# Patient Record
Sex: MALE | Race: WHITE | NOT HISPANIC OR LATINO | Employment: UNEMPLOYED | URBAN - METROPOLITAN AREA
[De-identification: names, ages, dates, MRNs, and addresses within clinical notes are randomized per-mention and may not be internally consistent; named-entity substitution may affect disease eponyms.]

---

## 2023-09-03 ENCOUNTER — HOSPITAL ENCOUNTER (INPATIENT)
Facility: HOSPITAL | Age: 48
LOS: 5 days | Discharge: HOME | DRG: 286 | End: 2023-09-08
Attending: FAMILY MEDICINE | Admitting: INTERNAL MEDICINE
Payer: MEDICAID

## 2023-09-03 DIAGNOSIS — I50.20 HFREF (HEART FAILURE WITH REDUCED EJECTION FRACTION): ICD-10-CM

## 2023-09-03 DIAGNOSIS — J90 PLEURAL EFFUSION: ICD-10-CM

## 2023-09-03 DIAGNOSIS — I34.0 NONRHEUMATIC MITRAL VALVE REGURGITATION: Chronic | ICD-10-CM

## 2023-09-03 DIAGNOSIS — I50.21 ACUTE SYSTOLIC CONGESTIVE HEART FAILURE: ICD-10-CM

## 2023-09-03 DIAGNOSIS — I50.9 CHF (CONGESTIVE HEART FAILURE): ICD-10-CM

## 2023-09-03 DIAGNOSIS — R06.02 SHORTNESS OF BREATH: ICD-10-CM

## 2023-09-03 DIAGNOSIS — R06.00 DYSPNEA: ICD-10-CM

## 2023-09-03 DIAGNOSIS — J18.9 PNEUMONIA OF BOTH LOWER LOBES DUE TO INFECTIOUS ORGANISM: Primary | ICD-10-CM

## 2023-09-03 DIAGNOSIS — I50.23 ACUTE ON CHRONIC HFREF (HEART FAILURE WITH REDUCED EJECTION FRACTION): ICD-10-CM

## 2023-09-03 LAB
ALBUMIN SERPL-MCNC: 3.3 G/DL (ref 3.5–5)
ALBUMIN/GLOB SERPL: 1 RATIO (ref 1.1–2)
ALP SERPL-CCNC: 93 UNIT/L (ref 40–150)
ALT SERPL-CCNC: 19 UNIT/L (ref 0–55)
APTT PPP: 31 SECONDS (ref 23.2–33.7)
AST SERPL-CCNC: 21 UNIT/L (ref 5–34)
BASOPHILS # BLD AUTO: 0.06 X10(3)/MCL
BASOPHILS NFR BLD AUTO: 0.6 %
BILIRUB SERPL-MCNC: 0.3 MG/DL
BNP BLD-MCNC: 835.5 PG/ML
BUN SERPL-MCNC: 15.7 MG/DL (ref 8.9–20.6)
CALCIUM SERPL-MCNC: 8.8 MG/DL (ref 8.4–10.2)
CHLORIDE SERPL-SCNC: 106 MMOL/L (ref 98–107)
CK MB SERPL-MCNC: 3.5 NG/ML
CK SERPL-CCNC: 129 U/L (ref 30–200)
CO2 SERPL-SCNC: 25 MMOL/L (ref 22–29)
CREAT SERPL-MCNC: 0.68 MG/DL (ref 0.73–1.18)
D DIMER PPP IA.FEU-MCNC: 0.8 UG/ML FEU (ref 0–0.5)
EOSINOPHIL # BLD AUTO: 0.13 X10(3)/MCL (ref 0–0.9)
EOSINOPHIL NFR BLD AUTO: 1.3 %
ERYTHROCYTE [DISTWIDTH] IN BLOOD BY AUTOMATED COUNT: 12.6 % (ref 11.5–17)
FLUAV AG UPPER RESP QL IA.RAPID: NOT DETECTED
FLUBV AG UPPER RESP QL IA.RAPID: NOT DETECTED
GFR SERPLBLD CREATININE-BSD FMLA CKD-EPI: >60 MLS/MIN/1.73/M2
GLOBULIN SER-MCNC: 3.4 GM/DL (ref 2.4–3.5)
GLUCOSE SERPL-MCNC: 103 MG/DL (ref 74–100)
HCT VFR BLD AUTO: 42.2 % (ref 42–52)
HGB BLD-MCNC: 13.8 G/DL (ref 14–18)
IMM GRANULOCYTES # BLD AUTO: 0.03 X10(3)/MCL (ref 0–0.04)
IMM GRANULOCYTES NFR BLD AUTO: 0.3 %
INR PPP: 1
LYMPHOCYTES # BLD AUTO: 2.14 X10(3)/MCL (ref 0.6–4.6)
LYMPHOCYTES NFR BLD AUTO: 21.1 %
MAGNESIUM SERPL-MCNC: 1.8 MG/DL (ref 1.6–2.6)
MCH RBC QN AUTO: 29.9 PG (ref 27–31)
MCHC RBC AUTO-ENTMCNC: 32.7 G/DL (ref 33–36)
MCV RBC AUTO: 91.3 FL (ref 80–94)
MONOCYTES # BLD AUTO: 0.83 X10(3)/MCL (ref 0.1–1.3)
MONOCYTES NFR BLD AUTO: 8.2 %
NEUTROPHILS # BLD AUTO: 6.96 X10(3)/MCL (ref 2.1–9.2)
NEUTROPHILS NFR BLD AUTO: 68.5 %
NRBC BLD AUTO-RTO: 0 %
PLATELET # BLD AUTO: 350 X10(3)/MCL (ref 130–400)
PMV BLD AUTO: 9.9 FL (ref 7.4–10.4)
POTASSIUM SERPL-SCNC: 3.8 MMOL/L (ref 3.5–5.1)
PROT SERPL-MCNC: 6.7 GM/DL (ref 6.4–8.3)
PROTHROMBIN TIME: 13.2 SECONDS (ref 11.4–14)
RBC # BLD AUTO: 4.62 X10(6)/MCL (ref 4.7–6.1)
SARS-COV-2 RNA RESP QL NAA+PROBE: NOT DETECTED
SODIUM SERPL-SCNC: 139 MMOL/L (ref 136–145)
TROPONIN I SERPL-MCNC: <0.01 NG/ML (ref 0–0.04)
TSH SERPL-ACNC: 2.13 UIU/ML (ref 0.35–4.94)
WBC # SPEC AUTO: 10.15 X10(3)/MCL (ref 4.5–11.5)

## 2023-09-03 PROCEDURE — 94660 CPAP INITIATION&MGMT: CPT

## 2023-09-03 PROCEDURE — 84443 ASSAY THYROID STIM HORMONE: CPT | Performed by: FAMILY MEDICINE

## 2023-09-03 PROCEDURE — 83735 ASSAY OF MAGNESIUM: CPT | Performed by: FAMILY MEDICINE

## 2023-09-03 PROCEDURE — 84484 ASSAY OF TROPONIN QUANT: CPT | Performed by: FAMILY MEDICINE

## 2023-09-03 PROCEDURE — 94761 N-INVAS EAR/PLS OXIMETRY MLT: CPT

## 2023-09-03 PROCEDURE — 27000221 HC OXYGEN, UP TO 24 HOURS

## 2023-09-03 PROCEDURE — 85610 PROTHROMBIN TIME: CPT | Performed by: FAMILY MEDICINE

## 2023-09-03 PROCEDURE — 82550 ASSAY OF CK (CPK): CPT | Performed by: FAMILY MEDICINE

## 2023-09-03 PROCEDURE — 0240U COVID/FLU A&B PCR: CPT | Performed by: FAMILY MEDICINE

## 2023-09-03 PROCEDURE — 21400001 HC TELEMETRY ROOM

## 2023-09-03 PROCEDURE — 85379 FIBRIN DEGRADATION QUANT: CPT | Performed by: FAMILY MEDICINE

## 2023-09-03 PROCEDURE — 83880 ASSAY OF NATRIURETIC PEPTIDE: CPT | Performed by: FAMILY MEDICINE

## 2023-09-03 PROCEDURE — 85025 COMPLETE CBC W/AUTO DIFF WBC: CPT | Performed by: FAMILY MEDICINE

## 2023-09-03 PROCEDURE — 99900035 HC TECH TIME PER 15 MIN (STAT)

## 2023-09-03 PROCEDURE — 27000190 HC CPAP FULL FACE MASK W/VALVE

## 2023-09-03 PROCEDURE — 99285 EMERGENCY DEPT VISIT HI MDM: CPT | Mod: 25

## 2023-09-03 PROCEDURE — 82553 CREATINE MB FRACTION: CPT | Performed by: FAMILY MEDICINE

## 2023-09-03 PROCEDURE — 93005 ELECTROCARDIOGRAM TRACING: CPT

## 2023-09-03 PROCEDURE — 85730 THROMBOPLASTIN TIME PARTIAL: CPT | Performed by: FAMILY MEDICINE

## 2023-09-03 PROCEDURE — 96375 TX/PRO/DX INJ NEW DRUG ADDON: CPT

## 2023-09-03 PROCEDURE — 96365 THER/PROPH/DIAG IV INF INIT: CPT

## 2023-09-03 PROCEDURE — 80053 COMPREHEN METABOLIC PANEL: CPT | Performed by: FAMILY MEDICINE

## 2023-09-03 RX ORDER — NITROGLYCERIN 20 MG/100ML
0-400 INJECTION INTRAVENOUS CONTINUOUS
Status: DISCONTINUED | OUTPATIENT
Start: 2023-09-04 | End: 2023-09-04

## 2023-09-03 RX ORDER — ACETAMINOPHEN 500 MG
1000 TABLET ORAL
Status: COMPLETED | OUTPATIENT
Start: 2023-09-04 | End: 2023-09-04

## 2023-09-03 NOTE — Clinical Note
Patient has been sick the whole time on the midodrine and was stop last Thursday and has improved with the shakes but  patient still has low BP and weakness. Son states he will not be giver her the medicine anymore.  Please advise something different per son The sheath was inserted into the right radial artery antegrade.

## 2023-09-03 NOTE — Clinical Note
The radial band was applied to the right radial artery. 10 cc's of air were inserted into the closure device. Fingers warm to touch. Cap refill < 3 sec. Move fingers w/ out any pain or discomfort.

## 2023-09-03 NOTE — Clinical Note
Please see anesthesia documentation. Anesthesia administering IV sedation medication for the procedure.

## 2023-09-03 NOTE — Clinical Note
42 ml of contrast were injected throughout the case. 10 mL of contrast was the total wasted during the case. 52 mL was the total amount used during the case.

## 2023-09-04 LAB
A-ADO2 BLOOD GAS (OHS): 124 MMHG
ALBUMIN SERPL-MCNC: 3.1 G/DL (ref 3.5–5)
ALBUMIN/GLOB SERPL: 0.9 RATIO (ref 1.1–2)
ALLENS TEST BLOOD GAS (OHS): YES
ALP SERPL-CCNC: 88 UNIT/L (ref 40–150)
ALT SERPL-CCNC: 18 UNIT/L (ref 0–55)
APPEARANCE UR: CLEAR
AST SERPL-CCNC: 18 UNIT/L (ref 5–34)
BACTERIA #/AREA URNS AUTO: ABNORMAL /HPF
BASE EXCESS BLD CALC-SCNC: 4.7 MMOL/L (ref -2–2)
BASOPHILS # BLD AUTO: 0.05 X10(3)/MCL
BASOPHILS NFR BLD AUTO: 0.6 %
BILIRUB SERPL-MCNC: 0.4 MG/DL
BILIRUB UR QL STRIP.AUTO: NEGATIVE
BLOOD GAS SAMPLE TYPE (OHS): ABNORMAL
BUN SERPL-MCNC: 12 MG/DL (ref 8.9–20.6)
CALCIUM SERPL-MCNC: 8.7 MG/DL (ref 8.4–10.2)
CHLORIDE SERPL-SCNC: 104 MMOL/L (ref 98–107)
CHOLEST SERPL-MCNC: 170 MG/DL
CHOLEST/HDLC SERPL: 3 {RATIO} (ref 0–5)
CO2 BLDA-SCNC: 29.3 MMOL/L (ref 22–26)
CO2 SERPL-SCNC: 25 MMOL/L (ref 22–29)
COHGB MFR BLDA: 2.4 % (ref 0.5–1.5)
COLOR UR: COLORLESS
CREAT SERPL-MCNC: 0.76 MG/DL (ref 0.73–1.18)
DRAWN BY BLOOD GAS (OHS): ABNORMAL
EOSINOPHIL # BLD AUTO: 0.07 X10(3)/MCL (ref 0–0.9)
EOSINOPHIL NFR BLD AUTO: 0.8 %
ERYTHROCYTE [DISTWIDTH] IN BLOOD BY AUTOMATED COUNT: 12.7 % (ref 11.5–17)
EST. AVERAGE GLUCOSE BLD GHB EST-MCNC: 108.3 MG/DL
FIO2 BLOOD GAS (OHS): 32 %
GFR SERPLBLD CREATININE-BSD FMLA CKD-EPI: >60 MLS/MIN/1.73/M2
GLOBULIN SER-MCNC: 3.3 GM/DL (ref 2.4–3.5)
GLUCOSE SERPL-MCNC: 142 MG/DL (ref 74–100)
GLUCOSE UR QL STRIP.AUTO: NORMAL
HBA1C MFR BLD: 5.4 %
HCO3 BLDA-SCNC: 28.2 MMOL/L (ref 22–26)
HCT VFR BLD AUTO: 41.5 % (ref 42–52)
HDLC SERPL-MCNC: 66 MG/DL (ref 35–60)
HGB BLD-MCNC: 13.8 G/DL (ref 14–18)
HYALINE CASTS #/AREA URNS LPF: ABNORMAL /LPF
IMM GRANULOCYTES # BLD AUTO: 0.02 X10(3)/MCL (ref 0–0.04)
IMM GRANULOCYTES NFR BLD AUTO: 0.2 %
KETONES UR QL STRIP.AUTO: NEGATIVE
LDLC SERPL CALC-MCNC: 88 MG/DL (ref 50–140)
LEUKOCYTE ESTERASE UR QL STRIP.AUTO: NEGATIVE
LPM (OHS): 3
LYMPHOCYTES # BLD AUTO: 1.79 X10(3)/MCL (ref 0.6–4.6)
LYMPHOCYTES NFR BLD AUTO: 20.9 %
MAGNESIUM SERPL-MCNC: 1.9 MG/DL (ref 1.6–2.6)
MCH RBC QN AUTO: 30.3 PG (ref 27–31)
MCHC RBC AUTO-ENTMCNC: 33.3 G/DL (ref 33–36)
MCV RBC AUTO: 91 FL (ref 80–94)
METHGB MFR BLDA: 1 % (ref 0–1.5)
MONOCYTES # BLD AUTO: 0.71 X10(3)/MCL (ref 0.1–1.3)
MONOCYTES NFR BLD AUTO: 8.3 %
MUCOUS THREADS URNS QL MICRO: ABNORMAL /LPF
NEUTROPHILS # BLD AUTO: 5.94 X10(3)/MCL (ref 2.1–9.2)
NEUTROPHILS NFR BLD AUTO: 69.2 %
NITRITE UR QL STRIP.AUTO: NEGATIVE
NRBC BLD AUTO-RTO: 0 %
O2 HB BLOOD GAS (OHS): 90.2 % (ref 94–100)
OXYGEN DEVICE BLOOD GAS (OHS): ABNORMAL
OXYHGB MFR BLDA: 13.8 G/DL (ref 12–18)
PAO2 BLOOD GAS (OHS): 182 MMHG
PCO2 BLDA: 37 MMHG (ref 35–45)
PH BLDA: 7.49 [PH] (ref 7.35–7.45)
PH UR STRIP.AUTO: 5.5 [PH]
PHOSPHATE SERPL-MCNC: 2.9 MG/DL (ref 2.3–4.7)
PLATELET # BLD AUTO: 321 X10(3)/MCL (ref 130–400)
PMV BLD AUTO: 10.2 FL (ref 7.4–10.4)
PO2 BLDA: 58 MMHG (ref 75–100)
POTASSIUM SERPL-SCNC: 3.5 MMOL/L (ref 3.5–5.1)
PROT SERPL-MCNC: 6.4 GM/DL (ref 6.4–8.3)
PROT UR QL STRIP.AUTO: NEGATIVE
RBC # BLD AUTO: 4.56 X10(6)/MCL (ref 4.7–6.1)
RBC #/AREA URNS AUTO: ABNORMAL /HPF
RBC UR QL AUTO: NEGATIVE
SAMPLE SITE BLOOD GAS (OHS): ABNORMAL
SAO2 % BLDA: 93.3 %
SODIUM SERPL-SCNC: 139 MMOL/L (ref 136–145)
SP GR UR STRIP.AUTO: 1.04 (ref 1–1.03)
SQUAMOUS #/AREA URNS LPF: ABNORMAL /HPF
TRIGL SERPL-MCNC: 80 MG/DL (ref 34–140)
TROPONIN I SERPL-MCNC: <0.01 NG/ML (ref 0–0.04)
UROBILINOGEN UR STRIP-ACNC: NORMAL
VLDLC SERPL CALC-MCNC: 16 MG/DL
WBC # SPEC AUTO: 8.58 X10(3)/MCL (ref 4.5–11.5)
WBC #/AREA URNS AUTO: ABNORMAL /HPF

## 2023-09-04 PROCEDURE — 80053 COMPREHEN METABOLIC PANEL: CPT | Performed by: STUDENT IN AN ORGANIZED HEALTH CARE EDUCATION/TRAINING PROGRAM

## 2023-09-04 PROCEDURE — 83036 HEMOGLOBIN GLYCOSYLATED A1C: CPT | Performed by: STUDENT IN AN ORGANIZED HEALTH CARE EDUCATION/TRAINING PROGRAM

## 2023-09-04 PROCEDURE — 63700000 PHARM REV CODE 250 ALT 637 W/O HCPCS: Performed by: INTERNAL MEDICINE

## 2023-09-04 PROCEDURE — 85025 COMPLETE CBC W/AUTO DIFF WBC: CPT | Performed by: STUDENT IN AN ORGANIZED HEALTH CARE EDUCATION/TRAINING PROGRAM

## 2023-09-04 PROCEDURE — 94761 N-INVAS EAR/PLS OXIMETRY MLT: CPT

## 2023-09-04 PROCEDURE — 83735 ASSAY OF MAGNESIUM: CPT | Performed by: STUDENT IN AN ORGANIZED HEALTH CARE EDUCATION/TRAINING PROGRAM

## 2023-09-04 PROCEDURE — 94660 CPAP INITIATION&MGMT: CPT

## 2023-09-04 PROCEDURE — 63600175 PHARM REV CODE 636 W HCPCS: Performed by: STUDENT IN AN ORGANIZED HEALTH CARE EDUCATION/TRAINING PROGRAM

## 2023-09-04 PROCEDURE — 63600175 PHARM REV CODE 636 W HCPCS: Performed by: INTERNAL MEDICINE

## 2023-09-04 PROCEDURE — 25000003 PHARM REV CODE 250: Performed by: INTERNAL MEDICINE

## 2023-09-04 PROCEDURE — 27000221 HC OXYGEN, UP TO 24 HOURS

## 2023-09-04 PROCEDURE — 82803 BLOOD GASES ANY COMBINATION: CPT

## 2023-09-04 PROCEDURE — 25500020 PHARM REV CODE 255

## 2023-09-04 PROCEDURE — 99900035 HC TECH TIME PER 15 MIN (STAT)

## 2023-09-04 PROCEDURE — 81001 URINALYSIS AUTO W/SCOPE: CPT | Performed by: FAMILY MEDICINE

## 2023-09-04 PROCEDURE — 84484 ASSAY OF TROPONIN QUANT: CPT | Performed by: INTERNAL MEDICINE

## 2023-09-04 PROCEDURE — 63600175 PHARM REV CODE 636 W HCPCS: Performed by: FAMILY MEDICINE

## 2023-09-04 PROCEDURE — 21400001 HC TELEMETRY ROOM

## 2023-09-04 PROCEDURE — 25000003 PHARM REV CODE 250: Performed by: FAMILY MEDICINE

## 2023-09-04 PROCEDURE — 80061 LIPID PANEL: CPT | Performed by: STUDENT IN AN ORGANIZED HEALTH CARE EDUCATION/TRAINING PROGRAM

## 2023-09-04 PROCEDURE — 25000003 PHARM REV CODE 250

## 2023-09-04 PROCEDURE — 87040 BLOOD CULTURE FOR BACTERIA: CPT | Performed by: FAMILY MEDICINE

## 2023-09-04 PROCEDURE — 25000003 PHARM REV CODE 250: Performed by: STUDENT IN AN ORGANIZED HEALTH CARE EDUCATION/TRAINING PROGRAM

## 2023-09-04 PROCEDURE — 84100 ASSAY OF PHOSPHORUS: CPT | Performed by: STUDENT IN AN ORGANIZED HEALTH CARE EDUCATION/TRAINING PROGRAM

## 2023-09-04 PROCEDURE — 36600 WITHDRAWAL OF ARTERIAL BLOOD: CPT

## 2023-09-04 RX ORDER — HYDRALAZINE HYDROCHLORIDE 20 MG/ML
10 INJECTION INTRAMUSCULAR; INTRAVENOUS EVERY 4 HOURS PRN
Status: DISCONTINUED | OUTPATIENT
Start: 2023-09-04 | End: 2023-09-05

## 2023-09-04 RX ORDER — ACETAMINOPHEN 325 MG/1
650 TABLET ORAL ONCE
Status: COMPLETED | OUTPATIENT
Start: 2023-09-04 | End: 2023-09-04

## 2023-09-04 RX ORDER — ACETAMINOPHEN 325 MG/1
650 TABLET ORAL EVERY 4 HOURS PRN
Status: DISCONTINUED | OUTPATIENT
Start: 2023-09-04 | End: 2023-09-08 | Stop reason: HOSPADM

## 2023-09-04 RX ORDER — KETOROLAC TROMETHAMINE 30 MG/ML
15 INJECTION, SOLUTION INTRAMUSCULAR; INTRAVENOUS ONCE
Status: COMPLETED | OUTPATIENT
Start: 2023-09-04 | End: 2023-09-04

## 2023-09-04 RX ORDER — FUROSEMIDE 10 MG/ML
40 INJECTION INTRAMUSCULAR; INTRAVENOUS
Status: COMPLETED | OUTPATIENT
Start: 2023-09-04 | End: 2023-09-04

## 2023-09-04 RX ORDER — FUROSEMIDE 10 MG/ML
40 INJECTION INTRAMUSCULAR; INTRAVENOUS ONCE
Status: DISCONTINUED | OUTPATIENT
Start: 2023-09-04 | End: 2023-09-04

## 2023-09-04 RX ORDER — FUROSEMIDE 10 MG/ML
40 INJECTION INTRAMUSCULAR; INTRAVENOUS ONCE
Status: COMPLETED | OUTPATIENT
Start: 2023-09-04 | End: 2023-09-04

## 2023-09-04 RX ORDER — AZITHROMYCIN 250 MG/1
500 TABLET, FILM COATED ORAL DAILY
Status: DISCONTINUED | OUTPATIENT
Start: 2023-09-04 | End: 2023-09-06

## 2023-09-04 RX ORDER — ENOXAPARIN SODIUM 100 MG/ML
40 INJECTION SUBCUTANEOUS EVERY 24 HOURS
Status: DISCONTINUED | OUTPATIENT
Start: 2023-09-04 | End: 2023-09-08 | Stop reason: HOSPADM

## 2023-09-04 RX ORDER — VENLAFAXINE HYDROCHLORIDE 75 MG/1
75 CAPSULE, EXTENDED RELEASE ORAL DAILY
Status: DISCONTINUED | OUTPATIENT
Start: 2023-09-04 | End: 2023-09-05

## 2023-09-04 RX ORDER — SODIUM CHLORIDE 0.9 % (FLUSH) 0.9 %
10 SYRINGE (ML) INJECTION
Status: DISCONTINUED | OUTPATIENT
Start: 2023-09-04 | End: 2023-09-08 | Stop reason: HOSPADM

## 2023-09-04 RX ADMIN — AZITHROMYCIN MONOHYDRATE 500 MG: 500 INJECTION, POWDER, LYOPHILIZED, FOR SOLUTION INTRAVENOUS at 02:09

## 2023-09-04 RX ADMIN — PIPERACILLIN AND TAZOBACTAM 4.5 G: 4; .5 INJECTION, POWDER, LYOPHILIZED, FOR SOLUTION INTRAVENOUS; PARENTERAL at 08:09

## 2023-09-04 RX ADMIN — ENOXAPARIN SODIUM 40 MG: 40 INJECTION SUBCUTANEOUS at 05:09

## 2023-09-04 RX ADMIN — FUROSEMIDE 40 MG: 10 INJECTION, SOLUTION INTRAMUSCULAR; INTRAVENOUS at 06:09

## 2023-09-04 RX ADMIN — KETOROLAC TROMETHAMINE 15 MG: 30 INJECTION INTRAMUSCULAR; INTRAVENOUS at 07:09

## 2023-09-04 RX ADMIN — IOHEXOL 100 ML: 350 INJECTION, SOLUTION INTRAVENOUS at 12:09

## 2023-09-04 RX ADMIN — VANCOMYCIN HYDROCHLORIDE 1000 MG: 1 INJECTION, POWDER, LYOPHILIZED, FOR SOLUTION INTRAVENOUS at 06:09

## 2023-09-04 RX ADMIN — VANCOMYCIN HYDROCHLORIDE 750 MG: 750 INJECTION, POWDER, LYOPHILIZED, FOR SOLUTION INTRAVENOUS at 07:09

## 2023-09-04 RX ADMIN — AZITHROMYCIN MONOHYDRATE 500 MG: 250 TABLET ORAL at 08:09

## 2023-09-04 RX ADMIN — PIPERACILLIN AND TAZOBACTAM 4.5 G: 4; .5 INJECTION, POWDER, LYOPHILIZED, FOR SOLUTION INTRAVENOUS; PARENTERAL at 10:09

## 2023-09-04 RX ADMIN — ACETAMINOPHEN 650 MG: 325 TABLET, FILM COATED ORAL at 02:09

## 2023-09-04 RX ADMIN — CEFTRIAXONE SODIUM 1 G: 1 INJECTION, POWDER, FOR SOLUTION INTRAMUSCULAR; INTRAVENOUS at 02:09

## 2023-09-04 RX ADMIN — FUROSEMIDE 40 MG: 10 INJECTION, SOLUTION INTRAMUSCULAR; INTRAVENOUS at 01:09

## 2023-09-04 RX ADMIN — ACETAMINOPHEN 650 MG: 325 TABLET, FILM COATED ORAL at 04:09

## 2023-09-04 RX ADMIN — ACETAMINOPHEN 1000 MG: 500 TABLET, FILM COATED ORAL at 12:09

## 2023-09-04 RX ADMIN — NITROGLYCERIN 30 MCG/MIN: 20 INJECTION INTRAVENOUS at 12:09

## 2023-09-04 RX ADMIN — PIPERACILLIN AND TAZOBACTAM 4.5 G: 4; .5 INJECTION, POWDER, LYOPHILIZED, FOR SOLUTION INTRAVENOUS; PARENTERAL at 02:09

## 2023-09-04 NOTE — CARE UPDATE
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 Patient was tachycardic, tachypneic, and hypertensive with spO2 91%. .5, CXR significant for bilateral lower lobe consolidations, loss of costophrenic angles, and pulmonary congestion. CTA PE negative. Started on Nitro drip and given 40mg IV Lasix in ED. Admitted for new onset CHF vs. Atypical pneumonia. Admitting VS met SIRS criteria, BC x 2 and RC pending but no fluids given due to potential new onset CHF differential. Of note, patient stated Venlafaxine and Adderall are home meds but no records in house and he is not aware of home dosages.      Team 4 assumed care this AM.     -Has diuresed well since Lasix 40mg IV in ED, 1.5L urine output since admission, night team ordered Lasix 40mg IV pending for 0600 administration   -BP appears to be better controlled with diuresis, continue hydralazine PRN though has not received any since admission, no antihypertensives taken at home, consider starting diuretic or ACE-I/ARB   -Changed low Na diet to include 1.5L fluid restriction   -Continue Rocephin and Azithromycin for potential infectious process   -Echo pending    -Currently on 2L NC, sating >92%, Ordered ABG due to CPAP PRN use       Continue to monitor.        Lg Hernandez MD   [facility]

## 2023-09-04 NOTE — H&P
[facility] History & Physical Note     Resident Team: [facility] List 4  Attending Physician: Sav De Paz MD    Date of Admit: 9/3/2023    Chief Complaint     Shortness of Breath (Pt arrives with c/o SOB that started about a week ago; pt denies any medical hx O2 sat 91% on room air)      Subjective:      History of Present Illness:  Todd Bernal is a 48 y.o.male with no significant past medical history presented to the ED on 9/3/2023  with a primary complaint of Shortness of Breath (Pt arrives with c/o SOB that started about a week ago; pt denies any medical hx O2 sat 91% on room air)  . 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 He reports that for 2 days after his exposure he had SOB, and then his symptoms resolved. Approximately 3 days ago he returned to working in garage, and then had a similar episode of SOB that has gotten progressively worse. 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 He endorses a dry cough. He denies any fever, chills, nausea, or vomiting. He reports no recent sick contacts. He reports that his brother was working in the same conditions and did not have any symptoms. He denies ever having any similar episodes of SOB in the past, and denies any history of asthma or allergic reactions.   In the ED the patient was afebrile, tachycardic to 133, tachypnic to 30. BP was elevated to 160/123. O2 was lower 90s on RA. Electrolytes were wnl, Cr was 0.68.  CBC was non remarkable. Troponin was negative, BNP was 835.5. D-dimer was 0.80. CTA chest was negative for PE, but showed some mild cardiomegaly, and patchy ground-glass opacities tending towards consolidation at the lung bases suggestive of multifocal possibly atypical infectious pneumonia. In the ED he was placed on  "CPAP, nitro drip, and given 40 mg Lasix. The patient was admitted for workup of new onset CHF vs pneumonia.     Past Medical History:  History reviewed. No pertinent past medical history.    Past Surgical History:  History reviewed. No pertinent surgical history.    Family History:  History reviewed. No pertinent family history.    Social History:  Social History     Tobacco Use    Smoking status: Never    Smokeless tobacco: Never   Substance Use Topics    Alcohol use: Never    Drug use: Never       Allergies:  Review of patient's allergies indicates:   Allergen Reactions    Phenergan [promethazine]        Home Medications:  Prior to Admission medications    Not on File       Review of Systems   Constitutional:  Negative for chills and fever.   HENT:  Negative for congestion and sore throat.    Respiratory:  Positive for cough and shortness of breath. Negative for sputum production and wheezing.    Cardiovascular:  Negative for chest pain and leg swelling.   Gastrointestinal:  Negative for abdominal pain, nausea and vomiting.   Neurological:  Negative for weakness.       Objective:   Last 24 Hour Vital Signs:  BP  Min: 115/85  Max: 160/123  Temp  Av.8 °F (36.6 °C)  Min: 97.8 °F (36.6 °C)  Max: 97.8 °F (36.6 °C)  Pulse  Av.2  Min: 100  Max: 133  Resp  Av.3  Min: 18  Max: 30  SpO2  Av.1 %  Min: 91 %  Max: 95 %  Height  Av' 5" (165.1 cm)  Min: 5' 5" (165.1 cm)  Max: 5' 5" (165.1 cm)  Weight  Av.2 kg (115 lb)  Min: 52.2 kg (115 lb)  Max: 52.2 kg (115 lb)  Body mass index is 19.14 kg/m².  No intake/output data recorded.    Physical Exam  Vitals reviewed.   Constitutional:       General: He is not in acute distress.     Appearance: He is not ill-appearing or toxic-appearing.   HENT:      Nose: No congestion or rhinorrhea.   Cardiovascular:      Rate and Rhythm: Regular rhythm. Tachycardia present.      Heart sounds: No murmur heard.     No friction rub. No gallop.   Pulmonary:      Effort: " "Pulmonary effort is normal. Tachypnea present. No accessory muscle usage or retractions.      Breath sounds: No wheezing.      Comments: On CPAP  Abdominal:      General: Abdomen is flat. Bowel sounds are normal. There is no distension.      Palpations: Abdomen is soft.      Tenderness: There is no abdominal tenderness. There is no guarding.   Musculoskeletal:      Right lower leg: No edema.      Left lower leg: No edema.   Neurological:      Mental Status: He is alert.       Laboratory:  Most Recent Data:  CBC:   Lab Results   Component Value Date    WBC 10.15 09/03/2023    HGB 13.8 (L) 09/03/2023    HCT 42.2 09/03/2023     09/03/2023    MCV 91.3 09/03/2023    RDW 12.6 09/03/2023     WBC Differential:   Recent Labs   Lab 09/03/23 2243   WBC 10.15   HGB 13.8*   HCT 42.2      MCV 91.3     BMP:   Lab Results   Component Value Date     09/03/2023    K 3.8 09/03/2023    CO2 25 09/03/2023    BUN 15.7 09/03/2023    CREATININE 0.68 (L) 09/03/2023    CALCIUM 8.8 09/03/2023    MG 1.80 09/03/2023     LFTs:   Lab Results   Component Value Date    ALBUMIN 3.3 (L) 09/03/2023    BILITOT 0.3 09/03/2023    AST 21 09/03/2023    ALKPHOS 93 09/03/2023    ALT 19 09/03/2023     Coags:   Lab Results   Component Value Date    INR 1.0 09/03/2023    PROTIME 13.2 09/03/2023    PTT 31.0 09/03/2023     FLP: No results found for: "CHOL", "HDL", "LDLCALC", "TRIG", "CHOLHDL"  DM:   Lab Results   Component Value Date    CREATININE 0.68 (L) 09/03/2023     Thyroid:   Lab Results   Component Value Date    TSH 2.131 09/03/2023      Anemia: No results found for: "IRON", "TIBC", "FERRITIN", "SATURATEDIRO"  No results found for: "[id number]"  No results found for: "FOLATE"     Cardiac:   Lab Results   Component Value Date    TROPONINI <0.010 09/03/2023    .5 (H) 09/03/2023     Urinalysis: No results found for: "LABURIN", "COLORU", "PHUA", "CLARITYU", "SPECGRAV", "LABSPEC", "NITRITE", "PROTEINUR", "GLUCOSEU", "KETONESU", " ""UROBILINOGEN", "BILIRUBINUR", "BLOODU", "RBCU", "WBCUA"    Trended Lab Data:  Recent Labs   Lab 09/03/23 2243   WBC 10.15   HGB 13.8*   HCT 42.2      MCV 91.3   RDW 12.6      K 3.8   CO2 25   BUN 15.7   CREATININE 0.68*   ALBUMIN 3.3*   BILITOT 0.3   AST 21   ALKPHOS 93   ALT 19       Trended Cardiac Data:  Recent Labs   Lab 09/03/23 2243   TROPONINI <0.010   .5*       Microbiology Data:  Microbiology Results (last 7 days)       Procedure Component Value Units Date/Time    Blood Culture #2 **CANNOT BE ORDERED STAT** [[id number]] Collected: 09/04/23 0143    Order Status: Sent Specimen: Blood from Hand, Right Updated: 09/04/23 0148    Blood Culture #1 **CANNOT BE ORDERED STAT** [[id number]] Collected: 09/04/23 0131    Order Status: Sent Specimen: Blood from Arm, Left Updated: 09/04/23 0146             Other Results:  EKG (my interpretation): appears normal, tachy sinus rhythm    Radiology:  Imaging Results              CTA Chest Non-Coronary (PE Studies) (Preliminary result)  Result time 09/04/23 00:27:01      Preliminary result by Hola Lubin MD (09/04/23 00:27:01)                   Narrative:    START OF REPORT:  Technique: CT Scan of the chest was performed with intravenous contrast with direct axial images as well as sagittal and coronal reconstruction images pulmonary embolus protocol.    Dosage Information: Automated Exposure Control was utilized 178.05 mGy.cm.    Comparison: None.    Clinical History: Pulmonary embolism (PE) suspected, positive D-dimer.    Findings:  Artifact: Streak artifact is seen in the right anterior chest wall partially obscuring the adjacent anatomy.  Soft Tissues: The visualized soft tissues of the chest wall appear unremarkable.  Axilla: A few mildly prominent lymph nodes are seen in the left and right axilla.  Neck: The visualized soft tissues of the neck appear unremarkable.  Mediastinum: The mediastinal structures are within normal limits.  Heart: Mild " cardiomegaly is seen.  Aorta: Unremarkable appearing aorta. No aortic dissection or aneurysm is seen.  Pulmonary Arteries: No filling defects are seen in the pulmonary arteries to suggest pulmonary embolus.  Lungs: There are areas of patchy ground-glass opacity with some central predominance with some additional areas of dense patchy opacity tending towards consolidation at the lung bases. The patient has bilateral moderate to large pleural effusions.  Bony Structures:  Spine: Mild multilevel spondylotic changes are seen in the thoracic spine. Mild anterior compression deformity of L1 is demonstrated.  Ribs: The visualized ribs appear unremarkable.  Abdomen: Incidental note is made of a left renal cyst arising from the superior pole of the visualized left kidney. The rest of the visualized upper abdominal organs appear unremarkable.      Impression:  1. Mild cardiomegaly is seen.  2. No filling defects are seen in the pulmonary arteries to suggest pulmonary embolus.  3. There are areas of patchy ground-glass opacity with some central predominance with some additional areas of dense patchy opacity tending towards consolidation at the lung bases. The patient has bilateral moderate to large pleural effusions. These findings are suggestive of multifocal possibly atypical infectious pneumonia with probable concurrent areas of dense possibly compressive atelectasis at the lung bases. Correlate with clinical and laboratory findings as regards additional evaluation and follow-up to full resolution.  4. Details and other findings as discussed above.                                         X-Ray Chest 1 View (Preliminary result)  Result time 09/03/23 23:26:30      Wet Read by Sav De Paz MD (09/03/23 23:26:30, [facility], Emergency Medicine)    Bilateral pulmonary vascular congestion with bilateral lower lobe consolidations / pulmonary edema                                     Assessment &  Plan:     1) Acute respiratory failure and hypoxemia       - CTA chest showed bilateral moderate to large pleural effusions       - At this time differential includes both new onset CHF vs atypical bilateral pneumonia. Unable to rule out component of pulmonary injury 2/2 to chemical exposure.       - Continue CPAP as tolerated       - Patient given 40 mg Lasix in ED, reassess output and then plan for repeat 40 mg Lasix        - Initiate azithromycin and rocephin         - Sputum and blood cultures pending        - GDMT pending Echo and BP stabilization        - Echo pending to establish baseline EF    2) Elevated BP       - Patient placed on nitro drip in ED, BP decreased from 160/123 to 115/85       - D/C'ed nitro drip at this time       - Hydralazine prn for SBP>170, DBP>110       - Consider addition of po medication for BP management once stabilizes       - Continuous cardiac monitoring    3) Anxiety       - Patient currently on home Effexor, unable to confirm home dosage       - Initiate Effexor 75 mg while inpatient          CODE STATUS: Full  Access: PIV  Antibiotics: Azithromycin, Rocephin  Diet: Adult regular  DVT Prophylaxis: Lovenox 40  GI Prophylaxis: N/A  Fluids: N/A      Disposition: Admit to telemetry for workup of PNA vs new onset CHF      Hari Branch MD  [facility]I

## 2023-09-04 NOTE — ED PROVIDER NOTES
Encounter Date: 9/3/2023       History     Chief Complaint   Patient presents with    Shortness of Breath     Pt arrives with c/o SOB that started about a week ago; pt denies any medical hx O2 sat 91% on room air     Patient is a 48-year-old gentleman presents emergency room complaints of shortness of breath.  Patient reports that he works as an , and reports being exposed to chemicals a few weeks ago pitting intermittent episodes of shortness of breath, with significant worsening over the last 3 days.  Patient reports shortness of breath with any type of activity and when lying back.  Denies fever chills.  Denies nausea or vomiting.    The history is provided by the patient.     Review of patient's allergies indicates:   Allergen Reactions    Phenergan [promethazine]      History reviewed. No pertinent past medical history.  History reviewed. No pertinent surgical history.  History reviewed. No pertinent family history.  Social History     Tobacco Use    Smoking status: Never    Smokeless tobacco: Never   Substance Use Topics    Alcohol use: Never    Drug use: Never     Review of Systems   Constitutional:  Negative for chills, fatigue and fever.   HENT:  Negative for ear pain, rhinorrhea and sore throat.    Eyes:  Negative for photophobia and pain.   Respiratory:  Positive for shortness of breath. Negative for cough and wheezing.    Cardiovascular:  Negative for chest pain.   Gastrointestinal:  Negative for abdominal pain, diarrhea, nausea and vomiting.   Genitourinary:  Negative for dysuria.   Neurological:  Negative for dizziness, weakness and headaches.   All other systems reviewed and are negative.      Physical Exam     Initial Vitals [09/03/23 2225]   BP Pulse Resp Temp SpO2   (!) 152/111 (!) 133 (!) 30 97.8 °F (36.6 °C) (!) 91 %      MAP       --         Physical Exam    Nursing note and vitals reviewed.  Constitutional: He appears well-developed and well-nourished. He appears distressed.   HENT:    Head: Normocephalic and atraumatic.   Mouth/Throat: Oropharynx is clear and moist.   Eyes: EOM are normal. Pupils are equal, round, and reactive to light.   Neck: Neck supple. JVD present.   Normal range of motion.  Cardiovascular:  Normal rate, regular rhythm, normal heart sounds and intact distal pulses.     Exam reveals no gallop and no friction rub.       No murmur heard.  Pulmonary/Chest: No respiratory distress. He has rales.   Tachypneic, rales noted at the middle and lower lobes bilaterally   Abdominal: Abdomen is soft. Bowel sounds are normal. He exhibits no distension. There is no abdominal tenderness.   Musculoskeletal:         General: No edema. Normal range of motion.      Cervical back: Normal range of motion and neck supple.     Neurological: He is alert and oriented to person, place, and time. He has normal strength.   Skin: Skin is warm and dry.   Psychiatric: He has a normal mood and affect. His behavior is normal. Judgment and thought content normal.         ED Course   Critical Care    Date/Time: 9/4/2023 2:08 AM    Performed by: Sav De Paz MD  Authorized by: Sav De Paz MD  Direct patient critical care time: 15 minutes  Ordering / reviewing critical care time: 10 minutes  Documentation critical care time: 15 minutes  Consulting other physicians critical care time: 5 minutes  Total critical care time (exclusive of procedural time) : 45 minutes  Critical care time was exclusive of teaching time.  Critical care was necessary to treat or prevent imminent or life-threatening deterioration of the following conditions: cardiac failure, sepsis, circulatory failure and respiratory failure.  Critical care was time spent personally by me on the following activities: interpretation of cardiac output measurements, examination of patient, ordering and performing treatments and interventions, ordering and review of radiographic studies, ordering and review of laboratory studies, pulse  oximetry and re-evaluation of patient's condition.        Labs Reviewed   COMPREHENSIVE METABOLIC PANEL - Abnormal; Notable for the following components:       Result Value    Glucose Level 103 (*)     Creatinine 0.68 (*)     Albumin Level 3.3 (*)     Albumin/Globulin Ratio 1.0 (*)     All other components within normal limits   B-TYPE NATRIURETIC PEPTIDE - Abnormal; Notable for the following components:    Natriuretic Peptide 835.5 (*)     All other components within normal limits   D DIMER, QUANTITATIVE - Abnormal; Notable for the following components:    D-Dimer 0.80 (*)     All other components within normal limits   CBC WITH DIFFERENTIAL - Abnormal; Notable for the following components:    RBC 4.62 (*)     Hgb 13.8 (*)     MCHC 32.7 (*)     All other components within normal limits   CK - Normal   CK-MB - Normal   TROPONIN I - Normal   COVID/FLU A&B PCR - Normal    Narrative:     The Xpert Xpress SARS-CoV-2/FLU/RSV plus is a rapid, multiplexed real-time PCR test intended for the simultaneous qualitative detection and differentiation of SARS-CoV-2, Influenza A, Influenza B, and respiratory syncytial virus (RSV) viral RNA in either nasopharyngeal swab or nasal swab specimens.         MAGNESIUM - Normal   APTT - Normal   PROTIME-INR - Normal   TSH - Normal   BLOOD CULTURE [facility]   BLOOD CULTURE [facility]   CBC W/ AUTO DIFFERENTIAL    Narrative:     The following orders were created for panel order CBC Auto Differential.  Procedure                               Abnormality         Status                     ---------                               -----------         ------                     CBC with Differential[[id number]]        Abnormal            Final result                 Please view results for these tests on the individual orders.   URINALYSIS, REFLEX TO URINE CULTURE   EXTRA TUBES    Narrative:     The following orders were created for panel order EXTRA TUBES.  Procedure                               Abnormality          Status                     ---------                               -----------         ------                     Gold Top Hold[[id number]]                                    In process                   Please view results for these tests on the individual orders.   GOLD TOP HOLD   EXTRA TUBES    Narrative:     The following orders were created for panel order EXTRA TUBES.  Procedure                               Abnormality         Status                     ---------                               -----------         ------                     Light Blue Top Hold[[id number]]                              In process                 Light Green Top Hold[[id number]]                             In process                 Lavender Top Hold[[id number]]                                In process                   Please view results for these tests on the individual orders.   LIGHT BLUE TOP HOLD   LIGHT GREEN TOP HOLD   LAVENDER TOP HOLD   COMPREHENSIVE METABOLIC PANEL   MAGNESIUM   PHOSPHORUS   CBC W/ AUTO DIFFERENTIAL    Narrative:     The following orders were created for panel order CBC auto differential.  Procedure                               Abnormality         Status                     ---------                               -----------         ------                     CBC with Differential[[id number]]                                                         Please view results for these tests on the individual orders.   CBC WITH DIFFERENTIAL     EKG Readings: (Independently Interpreted)   My Independent EKG Interpretation  09/03/2023 22:35 AM  Rate: 133 bpm  Rhythm: Sinus  Axis: Normal  Intervals: Normal  ST Changes: Nonspecific ST changes  Impression: Sinus tachycardia with nonspecific ST changes           Imaging Results              CTA Chest Non-Coronary (PE Studies) (Preliminary result)  Result time 09/04/23 00:27:01      Preliminary result by Hola Lubin MD (09/04/23 00:27:01)                    Narrative:    START OF REPORT:  Technique: CT Scan of the chest was performed with intravenous contrast with direct axial images as well as sagittal and coronal reconstruction images pulmonary embolus protocol.    Dosage Information: Automated Exposure Control was utilized 178.05 mGy.cm.    Comparison: None.    Clinical History: Pulmonary embolism (PE) suspected, positive D-dimer.    Findings:  Artifact: Streak artifact is seen in the right anterior chest wall partially obscuring the adjacent anatomy.  Soft Tissues: The visualized soft tissues of the chest wall appear unremarkable.  Axilla: A few mildly prominent lymph nodes are seen in the left and right axilla.  Neck: The visualized soft tissues of the neck appear unremarkable.  Mediastinum: The mediastinal structures are within normal limits.  Heart: Mild cardiomegaly is seen.  Aorta: Unremarkable appearing aorta. No aortic dissection or aneurysm is seen.  Pulmonary Arteries: No filling defects are seen in the pulmonary arteries to suggest pulmonary embolus.  Lungs: There are areas of patchy ground-glass opacity with some central predominance with some additional areas of dense patchy opacity tending towards consolidation at the lung bases. The patient has bilateral moderate to large pleural effusions.  Bony Structures:  Spine: Mild multilevel spondylotic changes are seen in the thoracic spine. Mild anterior compression deformity of L1 is demonstrated.  Ribs: The visualized ribs appear unremarkable.  Abdomen: Incidental note is made of a left renal cyst arising from the superior pole of the visualized left kidney. The rest of the visualized upper abdominal organs appear unremarkable.      Impression:  1. Mild cardiomegaly is seen.  2. No filling defects are seen in the pulmonary arteries to suggest pulmonary embolus.  3. There are areas of patchy ground-glass opacity with some central predominance with some additional areas of dense patchy opacity tending towards  consolidation at the lung bases. The patient has bilateral moderate to large pleural effusions. These findings are suggestive of multifocal possibly atypical infectious pneumonia with probable concurrent areas of dense possibly compressive atelectasis at the lung bases. Correlate with clinical and laboratory findings as regards additional evaluation and follow-up to full resolution.  4. Details and other findings as discussed above.                                         X-Ray Chest 1 View (Preliminary result)  Result time 09/03/23 23:26:30      Wet Read by Sav De Paz MD (09/03/23 23:26:30, [facility], Emergency Medicine)    Bilateral pulmonary vascular congestion with bilateral lower lobe consolidations / pulmonary edema                                     Medications   nitroGLYCERIN in 5 % dextrose 50 mg/250 mL (200 mcg/mL) infusion (30 mcg/min Intravenous New Bag 9/4/23 0010)   sodium chloride 0.9% flush 10 mL (has no administration in time range)   enoxaparin injection 40 mg (has no administration in time range)   acetaminophen tablet 1,000 mg (1,000 mg Oral Given 9/4/23 0005)   iohexoL (OMNIPAQUE 350) 350 mg iodine/mL injection (100 mLs Intravenous Given 9/4/23 0000)   furosemide injection 40 mg (40 mg Intravenous Given 9/4/23 0124)     Medical Decision Making  Patient is a 48-year-old gentleman presents emergency room with complaints shortness of breath that is acutely worsened over last 3 days, currently tachycardic, hypertensive, hypoxic.  Patient has crackles at the base of the lung but no peripheral edema.  Concerns for acute pulmonary edema.  Will obtain laboratory evaluation including cardiac workup and D-dimer.  Will continue to closely monitor     Differential diagnosis: Community-acquired pneumonia, acute left-sided congestive heart failure, acute pulmonary embolism, hypertensive emergency    Amount and/or Complexity of Data Reviewed  Labs: ordered.  Radiology:  ordered and independent interpretation performed.    Risk  OTC drugs.  Prescription drug management.  Decision regarding hospitalization.               ED Course as of 09/04/23 0208   Mon Sep 04, 2023   0119 No evidence of pulmonary embolism noted on CT scan.  Patient has bilateral infiltrates with pleural effusions.  Will obtain lactic acid and blood cultures, and internal medicine consulted for admission. [MW]   0123 Patient currently on nitroglycerin drip at 40 mics per minute, feeling improved.  Currently in the process of giving Lasix, and will begin titrating down nitro drip and hopefully get patient off of CPAP.  Patient's heart rate has improved with nitroglycerin, now down to 106 bpm. [MW]      ED Course User Index  [MW] Sav De Paz MD                    Clinical Impression:   Final diagnoses:  [R06.00] Dyspnea  [J18.9] Pneumonia of both lower lobes due to infectious organism (Primary)  [I50.21] Acute systolic congestive heart failure        ED Disposition Condition    Admit Stable                Sav De Paz MD  09/04/23 0208

## 2023-09-05 LAB
ALBUMIN SERPL-MCNC: 3.1 G/DL (ref 3.5–5)
ALBUMIN/GLOB SERPL: 0.9 RATIO (ref 1.1–2)
ALP SERPL-CCNC: 88 UNIT/L (ref 40–150)
ALT SERPL-CCNC: 15 UNIT/L (ref 0–55)
AST SERPL-CCNC: 15 UNIT/L (ref 5–34)
AV INDEX (PROSTH): 0.53
AV MEAN GRADIENT: 3 MMHG
AV PEAK GRADIENT: 4 MMHG
AV VALVE AREA BY VELOCITY RATIO: 1.97 CM²
AV VALVE AREA: 1.86 CM²
AV VELOCITY RATIO: 0.56
BASOPHILS # BLD AUTO: 0.06 X10(3)/MCL
BASOPHILS NFR BLD AUTO: 0.5 %
BILIRUB SERPL-MCNC: 0.6 MG/DL
BSA FOR ECHO PROCEDURE: 1.57 M2
BUN SERPL-MCNC: 14.8 MG/DL (ref 8.9–20.6)
CALCIUM SERPL-MCNC: 8.8 MG/DL (ref 8.4–10.2)
CHLORIDE SERPL-SCNC: 105 MMOL/L (ref 98–107)
CO2 SERPL-SCNC: 25 MMOL/L (ref 22–29)
CREAT SERPL-MCNC: 0.78 MG/DL (ref 0.73–1.18)
CV ECHO LV RWT: 0.36 CM
DOP CALC AO PEAK VEL: 1.02 M/S
DOP CALC AO VTI: 16.9 CM
DOP CALC LVOT AREA: 3.5 CM2
DOP CALC LVOT DIAMETER: 2.12 CM
DOP CALC LVOT PEAK VEL: 0.57 M/S
DOP CALC LVOT STROKE VOLUME: 31.4 CM3
DOP CALC MV VTI: 21.9 CM
DOP CALCLVOT PEAK VEL VTI: 8.9 CM
E WAVE DECELERATION TIME: 141.6 MSEC
E/E' RATIO: 17.53 M/S
ECHO LV POSTERIOR WALL: 1.2 CM (ref 0.6–1.1)
EOSINOPHIL # BLD AUTO: 0.12 X10(3)/MCL (ref 0–0.9)
EOSINOPHIL NFR BLD AUTO: 1.1 %
ERYTHROCYTE [DISTWIDTH] IN BLOOD BY AUTOMATED COUNT: 12.7 % (ref 11.5–17)
FRACTIONAL SHORTENING: 11 % (ref 28–44)
GFR SERPLBLD CREATININE-BSD FMLA CKD-EPI: >60 MLS/MIN/1.73/M2
GLOBULIN SER-MCNC: 3.6 GM/DL (ref 2.4–3.5)
GLUCOSE SERPL-MCNC: 108 MG/DL (ref 74–100)
HCT VFR BLD AUTO: 43.5 % (ref 42–52)
HGB BLD-MCNC: 14.8 G/DL (ref 14–18)
HIV 1+2 AB+HIV1 P24 AG SERPL QL IA: NONREACTIVE
IMM GRANULOCYTES # BLD AUTO: 0.03 X10(3)/MCL (ref 0–0.04)
IMM GRANULOCYTES NFR BLD AUTO: 0.3 %
INTERVENTRICULAR SEPTUM: 0.68 CM (ref 0.6–1.1)
LEFT ATRIUM SIZE: 4.38 CM
LEFT INTERNAL DIMENSION IN SYSTOLE: 5.95 CM (ref 2.1–4)
LEFT VENTRICLE DIASTOLIC VOLUME INDEX: 147.52 ML/M2
LEFT VENTRICLE DIASTOLIC VOLUME: 233.08 ML
LEFT VENTRICLE MASS INDEX: 176 G/M2
LEFT VENTRICLE SYSTOLIC VOLUME INDEX: 111.6 ML/M2
LEFT VENTRICLE SYSTOLIC VOLUME: 176.33 ML
LEFT VENTRICULAR INTERNAL DIMENSION IN DIASTOLE: 6.72 CM (ref 3.5–6)
LEFT VENTRICULAR MASS: 277.32 G
LV LATERAL E/E' RATIO: 16.56 M/S
LV SEPTAL E/E' RATIO: 18.63 M/S
LVOT MG: 0.75 MMHG
LVOT MV: 0.4 CM/S
LYMPHOCYTES # BLD AUTO: 1.92 X10(3)/MCL (ref 0.6–4.6)
LYMPHOCYTES NFR BLD AUTO: 17.3 %
MAGNESIUM SERPL-MCNC: 2.1 MG/DL (ref 1.6–2.6)
MCH RBC QN AUTO: 30.1 PG (ref 27–31)
MCHC RBC AUTO-ENTMCNC: 34 G/DL (ref 33–36)
MCV RBC AUTO: 88.6 FL (ref 80–94)
MONOCYTES # BLD AUTO: 0.98 X10(3)/MCL (ref 0.1–1.3)
MONOCYTES NFR BLD AUTO: 8.8 %
MV MEAN GRADIENT: 4 MMHG
MV PEAK E VEL: 1.49 M/S
MV PEAK GRADIENT: 10 MMHG
MV STENOSIS PRESSURE HALF TIME: 41.06 MS
MV VALVE AREA BY CONTINUITY EQUATION: 1.43 CM2
MV VALVE AREA P 1/2 METHOD: 5.36 CM2
NEUTROPHILS # BLD AUTO: 7.98 X10(3)/MCL (ref 2.1–9.2)
NEUTROPHILS NFR BLD AUTO: 72 %
NRBC BLD AUTO-RTO: 0 %
OHS LV EJECTION FRACTION SIMPSONS BIPLANE MOD: 24 %
PHOSPHATE SERPL-MCNC: 3.5 MG/DL (ref 2.3–4.7)
PISA TR MAX VEL: 3.41 M/S
PLATELET # BLD AUTO: 342 X10(3)/MCL (ref 130–400)
PMV BLD AUTO: 10 FL (ref 7.4–10.4)
POTASSIUM SERPL-SCNC: 3.8 MMOL/L (ref 3.5–5.1)
PROT SERPL-MCNC: 6.7 GM/DL (ref 6.4–8.3)
RA PRESSURE ESTIMATED: 3 MMHG
RBC # BLD AUTO: 4.91 X10(6)/MCL (ref 4.7–6.1)
RV TB RVSP: 6 MMHG
SODIUM SERPL-SCNC: 139 MMOL/L (ref 136–145)
T PALLIDUM AB SER QL: NONREACTIVE
TDI LATERAL: 0.09 M/S
TDI SEPTAL: 0.08 M/S
TDI: 0.09 M/S
TR MAX PG: 47 MMHG
TRICUSPID ANNULAR PLANE SYSTOLIC EXCURSION: 2.4 CM
TV REST PULMONARY ARTERY PRESSURE: 50 MMHG
VANCOMYCIN TROUGH SERPL-MCNC: 5.6 UG/ML (ref 15–20)
WBC # SPEC AUTO: 11.09 X10(3)/MCL (ref 4.5–11.5)
Z-SCORE OF LEFT VENTRICULAR DIMENSION IN END DIASTOLE: 3.95
Z-SCORE OF LEFT VENTRICULAR DIMENSION IN END SYSTOLE: 5.94

## 2023-09-05 PROCEDURE — 83735 ASSAY OF MAGNESIUM: CPT | Performed by: STUDENT IN AN ORGANIZED HEALTH CARE EDUCATION/TRAINING PROGRAM

## 2023-09-05 PROCEDURE — 80053 COMPREHEN METABOLIC PANEL: CPT | Performed by: STUDENT IN AN ORGANIZED HEALTH CARE EDUCATION/TRAINING PROGRAM

## 2023-09-05 PROCEDURE — 80202 ASSAY OF VANCOMYCIN: CPT | Performed by: INTERNAL MEDICINE

## 2023-09-05 PROCEDURE — 21400001 HC TELEMETRY ROOM

## 2023-09-05 PROCEDURE — 25000003 PHARM REV CODE 250: Performed by: STUDENT IN AN ORGANIZED HEALTH CARE EDUCATION/TRAINING PROGRAM

## 2023-09-05 PROCEDURE — 86780 TREPONEMA PALLIDUM: CPT

## 2023-09-05 PROCEDURE — 27000221 HC OXYGEN, UP TO 24 HOURS

## 2023-09-05 PROCEDURE — 63600175 PHARM REV CODE 636 W HCPCS

## 2023-09-05 PROCEDURE — 63600175 PHARM REV CODE 636 W HCPCS: Performed by: STUDENT IN AN ORGANIZED HEALTH CARE EDUCATION/TRAINING PROGRAM

## 2023-09-05 PROCEDURE — 94761 N-INVAS EAR/PLS OXIMETRY MLT: CPT

## 2023-09-05 PROCEDURE — 85025 COMPLETE CBC W/AUTO DIFF WBC: CPT | Performed by: STUDENT IN AN ORGANIZED HEALTH CARE EDUCATION/TRAINING PROGRAM

## 2023-09-05 PROCEDURE — 84100 ASSAY OF PHOSPHORUS: CPT | Performed by: STUDENT IN AN ORGANIZED HEALTH CARE EDUCATION/TRAINING PROGRAM

## 2023-09-05 PROCEDURE — 63600175 PHARM REV CODE 636 W HCPCS: Performed by: INTERNAL MEDICINE

## 2023-09-05 PROCEDURE — 25000003 PHARM REV CODE 250: Performed by: INTERNAL MEDICINE

## 2023-09-05 PROCEDURE — 87389 HIV-1 AG W/HIV-1&-2 AB AG IA: CPT

## 2023-09-05 PROCEDURE — 63700000 PHARM REV CODE 250 ALT 637 W/O HCPCS: Performed by: INTERNAL MEDICINE

## 2023-09-05 PROCEDURE — 25000003 PHARM REV CODE 250

## 2023-09-05 RX ORDER — LABETALOL HCL 20 MG/4 ML
10 SYRINGE (ML) INTRAVENOUS EVERY 4 HOURS PRN
Status: DISCONTINUED | OUTPATIENT
Start: 2023-09-05 | End: 2023-09-08 | Stop reason: HOSPADM

## 2023-09-05 RX ORDER — FUROSEMIDE 20 MG/1
40 TABLET ORAL DAILY
Status: DISCONTINUED | OUTPATIENT
Start: 2023-09-06 | End: 2023-09-06

## 2023-09-05 RX ORDER — FUROSEMIDE 10 MG/ML
40 INJECTION INTRAMUSCULAR; INTRAVENOUS ONCE
Status: COMPLETED | OUTPATIENT
Start: 2023-09-05 | End: 2023-09-05

## 2023-09-05 RX ORDER — VENLAFAXINE HYDROCHLORIDE 75 MG/1
75 CAPSULE, EXTENDED RELEASE ORAL DAILY
Status: DISCONTINUED | OUTPATIENT
Start: 2023-09-06 | End: 2023-09-08 | Stop reason: HOSPADM

## 2023-09-05 RX ORDER — METOPROLOL SUCCINATE 25 MG/1
25 TABLET, EXTENDED RELEASE ORAL DAILY
Status: DISCONTINUED | OUTPATIENT
Start: 2023-09-06 | End: 2023-09-06

## 2023-09-05 RX ORDER — KETOROLAC TROMETHAMINE 30 MG/ML
15 INJECTION, SOLUTION INTRAMUSCULAR; INTRAVENOUS ONCE
Status: COMPLETED | OUTPATIENT
Start: 2023-09-05 | End: 2023-09-05

## 2023-09-05 RX ADMIN — ENOXAPARIN SODIUM 40 MG: 40 INJECTION SUBCUTANEOUS at 05:09

## 2023-09-05 RX ADMIN — FUROSEMIDE 40 MG: 10 INJECTION, SOLUTION INTRAMUSCULAR; INTRAVENOUS at 02:09

## 2023-09-05 RX ADMIN — VANCOMYCIN HYDROCHLORIDE 750 MG: 750 INJECTION, POWDER, LYOPHILIZED, FOR SOLUTION INTRAVENOUS at 07:09

## 2023-09-05 RX ADMIN — PIPERACILLIN AND TAZOBACTAM 4.5 G: 4; .5 INJECTION, POWDER, LYOPHILIZED, FOR SOLUTION INTRAVENOUS; PARENTERAL at 02:09

## 2023-09-05 RX ADMIN — AZITHROMYCIN MONOHYDRATE 500 MG: 250 TABLET ORAL at 08:09

## 2023-09-05 RX ADMIN — ACETAMINOPHEN 650 MG: 325 TABLET, FILM COATED ORAL at 04:09

## 2023-09-05 RX ADMIN — PIPERACILLIN AND TAZOBACTAM 4.5 G: 4; .5 INJECTION, POWDER, LYOPHILIZED, FOR SOLUTION INTRAVENOUS; PARENTERAL at 10:09

## 2023-09-05 RX ADMIN — SACUBITRIL AND VALSARTAN 1 TABLET: 24; 26 TABLET, FILM COATED ORAL at 08:09

## 2023-09-05 RX ADMIN — HYDRALAZINE HYDROCHLORIDE 10 MG: 20 INJECTION INTRAMUSCULAR; INTRAVENOUS at 04:09

## 2023-09-05 RX ADMIN — PIPERACILLIN AND TAZOBACTAM 4.5 G: 4; .5 INJECTION, POWDER, LYOPHILIZED, FOR SOLUTION INTRAVENOUS; PARENTERAL at 06:09

## 2023-09-05 RX ADMIN — VANCOMYCIN HYDROCHLORIDE 750 MG: 750 INJECTION, POWDER, LYOPHILIZED, FOR SOLUTION INTRAVENOUS at 05:09

## 2023-09-05 RX ADMIN — KETOROLAC TROMETHAMINE 15 MG: 30 INJECTION, SOLUTION INTRAMUSCULAR; INTRAVENOUS at 08:09

## 2023-09-05 NOTE — PROGRESS NOTES
Pharmacokinetic Assessment Follow Up: IV Vancomycin    Vancomycin serum concentration assessment(s):    The trough level was drawn correctly and can be used to guide therapy at this time. The measurement is below the desired definitive target range of 10 to 20 mcg/mL.    Vancomycin Regimen Plan:    Continue regimen to Vancomycin 750 mg IV every 12 hours with next serum trough concentration measured at 60 min prior to 1700 dose on 9/6    Drug levels (last 3 results):  Recent Labs   Lab Result Units 09/05/23  1705   Vancomycin Trough ug/ml 5.6*       Pharmacy will continue to follow and monitor vancomycin.    Please contact pharmacy at extension [phone] for questions regarding this assessment.    Thank you for the consult,   Gaurav Bowie       Patient brief summary:  Todd Bernal is a 48 y.o. male initiated on antimicrobial therapy with IV Vancomycin for treatment of lower respiratory infection    The patient's current regimen is 75d0 mg q12h    Drug Allergies:   Review of patient's allergies indicates:   Allergen Reactions    Phenergan [promethazine]        Actual Body Weight:   53.5 kg    Renal Function:   Estimated Creatinine Clearance: 88.6 mL/min (based on SCr of 0.78 mg/dL).,       CBC (last 72 hours):  Recent Labs   Lab Result Units 09/03/23 2243 09/04/23 0424 09/05/23  0328   WBC x10(3)/mcL 10.15 8.58 11.09   Hgb g/dL 13.8* 13.8* 14.8   Hemoglobin A1c %  --  5.4  --    Hct % 42.2 41.5* 43.5   Platelet x10(3)/mcL 350 321 342   Mono % % 8.2 8.3 8.8   Eos % % 1.3 0.8 1.1   Basophil % % 0.6 0.6 0.5       Metabolic Panel (last 72 hours):  Recent Labs   Lab Result Units 09/03/23 2243 09/04/23  0126 09/04/23  0424 09/05/23  0328   Sodium Level mmol/L 139  --  139 139   Potassium Level mmol/L 3.8  --  3.5 3.8   Chloride mmol/L 106  --  104 105   Carbon Dioxide mmol/L 25  --  25 25   Glucose Level mg/dL 103*  --  142* 108*   Glucose, UA   --  Normal  --   --    Blood Urea Nitrogen mg/dL 15.7  --  12.0 14.8   Creatinine  mg/dL 0.68*  --  0.76 0.78   Albumin Level g/dL 3.3*  --  3.1* 3.1*   Bilirubin Total mg/dL 0.3  --  0.4 0.6   Alkaline Phosphatase unit/L 93  --  88 88   Aspartate Aminotransferase unit/L 21  --  18 15   Alanine Aminotransferase unit/L 19  --  18 15   Magnesium Level mg/dL 1.80  --  1.90 2.10   Phosphorus Level mg/dL  --   --  2.9 3.5       Vancomycin Administrations:  vancomycin given in the last 96 hours                     vancomycin 750 mg in dextrose 5 % (D5W) 250 mL IVPB (mg) 750 mg New Bag 09/05/23 0535     750 mg New Bag 09/04/23 1901    vancomycin (VANCOCIN) 1,000 mg in dextrose 5 % (D5W) 250 mL IVPB (mg) 1,000 mg New Bag 09/04/23 0608                    Microbiologic Results:  Microbiology Results (last 7 days)       Procedure Component Value Units Date/Time    Blood Culture #1 **CANNOT BE ORDERED STAT** [[id number]]  (Normal) Collected: 09/04/23 0131    Order Status: Completed Specimen: Blood from Arm, Left Updated: 09/05/23 1000     CULTURE, BLOOD ([facility]) No Growth At 24 Hours    Blood Culture #2 **CANNOT BE ORDERED STAT** [[id number]]  (Normal) Collected: 09/04/23 0143    Order Status: Completed Specimen: Blood from Hand, Right Updated: 09/05/23 1000     CULTURE, BLOOD ([facility]) No Growth At 24 Hours    Respiratory Culture [[id number]]     Order Status: Sent Specimen: Sputum

## 2023-09-05 NOTE — PROGRESS NOTES
"Inpatient Nutrition Evaluation    Admit Date: 9/3/2023   Total duration of encounter: 2 days    Nutrition Recommendation/Prescription     Continue Low Na 2g, 1500mL fluid restriction as tolerated  Diet educ provided  Monitor po intake, wt, labs    Nutrition Assessment     Chart Review    Reason Seen: continuous nutrition monitoring    Malnutrition Screening Tool Results   Have you recently lost weight without trying?: No  Have you been eating poorly because of a decreased appetite?: No   MST Score: 0     Diagnosis: Acute respiratory failure and hypoxemia  New onset CHF vs atypical bilateral pneumonia  Elevated BP  Anxiety    Relevant Medical History: No significant past medical history     Nutrition-Related Medications: zosyn, vanco    Nutrition-Related Labs: -Gluc 108, Alb 3.1, GFR >60      Diet Order: Diet Low Sodium, 2gm Fluid - 1500mL  Diet NPO  Oral Supplement Order: none  Appetite/Oral Intake: good/% of meals  Factors Affecting Nutritional Intake: none identified  Food/Mosque/Cultural Preferences: none reported  Food Allergies: no known food allergies    Skin Integrity: wound  Wound(s):   None noted    Comments  : Pt reports good po intake of meals. Pt denies any GI complaints. Pt reports good appetite and no recent wt loss pta; states -120#; current wt stable. Pt noted with possible new onset CHF; diet educ provided      Anthropometrics    Height: 5' 5" (165.1 cm)    Last Weight: 53.5 kg (118 lb) (23 0800) Weight Method: Bed Scale  BMI (Calculated): 19.6  BMI Classification: normal (BMI 18.5-24.9)        Ideal Body Weight (IBW), Male: 136 lb     % Ideal Body Weight, Male (lb): 86.76 %                 Usual Body Weight (UBW), k.27 kg (-120#)  % Usual Body Weight: 102.61     Usual Weight Provided By: patient    Wt Readings from Last 5 Encounters:   23 53.5 kg (118 lb)     Weight Change(s) Since Admission:  Admit Weight: 52.2 kg (115 lb) (23 2225)  : UBW " 115-120#; wt stable    Patient Education    Education Provided: low sodium diet  Teaching Method: explanation and printed materials  Comprehension: verbalizes understanding  Barriers to Learning: none evident  Expected Compliance: good  Comments: All questions were answered and dietitian's contact information was provided.     Monitoring & Evaluation     Dietitian will monitor food and beverage intake and weight.  Nutrition Risk/Follow-Up: low (follow-up in 5-7 days)  Patients assigned 'low nutrition risk' status do not qualify for a full nutritional assessment but will be monitored and re-evaluated in a 5-7 day time period. Please consult if re-evaluation needed sooner.

## 2023-09-05 NOTE — H&P
"Cardiology Consult Note     Cardiology Attending: Dr. Fonseca   Cardiology Fellow: Molly Serrano DO     Date of Admit: 9/3/2023  Date of Consult: 9/5/2023    Reason for Consultation:     "New-Onset HFrEF"    History of Present Illness:      Todd Bernal is a 48 y.o. male with no significant PMH who presented to the ED on 9/4 with complaints of SOB x1 week. He reports recently working in a garage with a lot of dust and chemical fumes which caused him to become SOB. He also endorsed a dry cough at that time. In the ED, he was noted to have elevated BNP and CXR showing cardiomegaly. He was admitted for further evaluation. Workup was significant for HFrEF and cardiology was consulted for ischemic evaluation.     Of note, patient has a family history of premature CAD with his youngest relative diagnosed with CAD at age 42.     Past Medical History:   History reviewed. No pertinent past medical history.  Surgical History:   History reviewed. No pertinent surgical history.  Allergies:     Review of patient's allergies indicates:   Allergen Reactions    Phenergan [promethazine]      Family History:   History reviewed. No pertinent family history.  Social History:     Social History     Tobacco Use    Smoking status: Never    Smokeless tobacco: Never   Substance Use Topics    Alcohol use: Never    Drug use: Never     Review of Systems:     Review of Systems   Constitutional:  Negative for chills, diaphoresis and fever.   HENT:  Negative for hearing loss and nosebleeds.    Eyes:  Negative for blurred vision.   Respiratory:  Positive for shortness of breath.    Cardiovascular:  Negative for chest pain, palpitations, orthopnea, claudication and PND.   Gastrointestinal:  Negative for nausea and vomiting.   Genitourinary:  Negative for dysuria.   Musculoskeletal:  Negative for myalgias.   Skin:  Negative for rash.   Neurological:  Negative for dizziness and headaches.     Medications:     Home Medications:  Prior to " "Admission medications    Not on File       Current/Inpatient Medications:  Infusions:    Scheduled:   azithromycin  500 mg Oral Daily    enoxparin  40 mg Subcutaneous Daily    furosemide (LASIX) injection  40 mg Intravenous Once    piperacillin-tazobactam (Zosyn) IV (PEDS and ADULTS) (extended infusion is not appropriate)  4.5 g Intravenous Q8H    vancomycin (VANCOCIN) IV (PEDS and ADULTS)  750 mg Intravenous Q12H    [START ON 9/6/2023] venlafaxine  75 mg Oral Daily     PRN:  acetaminophen, hydrALAZINE, sodium chloride 0.9%, Pharmacy to dose Vancomycin consult **AND** vancomycin - pharmacy to dose    Objective:     24-hour Vitals:   Temp:  [97.5 °F (36.4 °C)-98.6 °F (37 °C)] 97.7 °F (36.5 °C)  Pulse:  [109-116] 109  Resp:  [18] 18  SpO2:  [92 %-96 %] 95 %  BP: (126-152)/() 134/99    Vitals: BP (!) 134/99   Pulse 109   Temp 97.7 °F (36.5 °C) (Oral)   Resp 18   Ht 5' 5" (1.651 m)   Wt 53.5 kg (118 lb)   SpO2 95%   BMI 19.64 kg/m²     Intake/Output Summary (Last 24 hours) at 9/5/2023 1352  Last data filed at 9/5/2023 0623  Gross per 24 hour   Intake 813.06 ml   Output 1300 ml   Net -486.94 ml       Physical Exam  Constitutional:       Appearance: Normal appearance.   HENT:      Head: Normocephalic and atraumatic.   Eyes:      Conjunctiva/sclera: Conjunctivae normal.   Neck:      Vascular: No carotid bruit.   Cardiovascular:      Rate and Rhythm: Normal rate and regular rhythm.   Pulmonary:      Effort: Pulmonary effort is normal.      Breath sounds: Normal breath sounds.   Abdominal:      General: Bowel sounds are normal.      Palpations: Abdomen is soft.   Musculoskeletal:      Right lower leg: Edema present.      Left lower leg: Edema present.   Skin:     General: Skin is warm and dry.   Neurological:      Mental Status: He is alert. Mental status is at baseline.   Psychiatric:         Mood and Affect: Mood normal.         Thought Content: Thought content normal.         Judgment: Judgment normal.      "   Labs:   I have reviewed the following labs below:    Recent Labs   Lab 09/03/23  2243 09/04/23  0424 09/04/23  1152 09/05/23  0328   WBC 10.15 8.58  --  11.09   HGB 13.8* 13.8*  --  14.8   HCT 42.2 41.5*  --  43.5    321  --  342    139  --  139   K 3.8 3.5  --  3.8   CO2 25 25  --  25   BUN 15.7 12.0  --  14.8   CREATININE 0.68* 0.76  --  0.78   CALCIUM 8.8 8.7  --  8.8   MG 1.80 1.90  --  2.10   PHOS  --  2.9  --  3.5   ALBUMIN 3.3* 3.1*  --  3.1*   BILITOT 0.3 0.4  --  0.6   AST 21 18  --  15   ALT 19 18  --  15   ALKPHOS 93 88  --  88   PTT 31.0  --   --   --    INR 1.0  --   --   --    TROPONINI <0.010  --  <0.010  --    .5*  --   --   --      Cardiovascular Studies/Imaging:   I have reviewed the following studies below:    TTE (9/5/2023):   Left Ventricle: The left ventricle is moderately dilated. There is severely reduced systolic function with a visually estimated ejection fraction of 15 - 20%.    Left Atrium: Left atrium is dilated. LA volume index is 62ml/m2.    Right Ventricle: Normal right ventricular cavity size. Systolic function is normal.    Right Atrium: Right atrium is mildly dilated.    Aortic Valve: The aortic valve is a trileaflet valve.    Mitral Valve: There is moderate to severe regurgitation.    Tricuspid Valve: There is mild to moderate regurgitation.    Pulmonary Artery: The estimated pulmonary artery systolic pressure is 50 mmHg.    IVC/SVC: Normal venous pressure at 3 mmHg.    Pericardium: There is a small effusion. Bilateral pleural effusions.      Imaging:   X-Ray Chest 1 View - 9/5/2023  EXAMINATION: XR CHEST 1 VIEW CPT 15292 CLINICAL HISTORY: pna; COMPARISON: September 3, 2023 FINDINGS: Examination reveals cardiomediastinal silhouette and pleuroparenchymal changes to be essentially unchanged as compared with the previous exam. Bilateral pleural effusions are again identified. Increased left retrocardiac density and silhouetting of the left hemidiaphragm is seen  which might be related to pleural fluid and or represent an infiltrate/atelectasis       11:47    X-Ray Chest 1 View - 9/4/2023  EXAMINATION: XR CHEST 1 VIEW CLINICAL HISTORY: Dyspnea; COMPARISON: None FINDINGS: Single view of the chest shows bilateral effusions with dependent patchy ground-glass opacities and central vascular congestion.  No pneumothorax.  Heart is upper normal in size. Findings suggestive of fluid overload/heart failure       CTA CHEST NON CORONARY (PE STUDIES) - 9/4/2023    Technique: CT Scan of the chest was performed with intravenous contrast with direct axial images as well as sagittal and coronal reconstruction images pulmonary embolus protocol.  Dosage Information: Automated Exposure Control was utilized 178.05 mGy.cm.  Comparison: None.  Clinical History: Pulmonary embolism (PE) suspected, positive D-dimer.  Findings:Artifact: Streak artifact is seen in the right anterior chest wall partially obscuring the adjacent anatomy.  Soft Tissues: The visualized soft tissues of the chest wall appear unremarkable.  Axilla: A few mildly prominent lymph nodes are seen in the left and right axilla.  Neck: The visualized soft tissues of the neck appear unremarkable.  Mediastinum: The mediastinal structures are within normal limits.  Heart: Mild cardiomegaly is seen.  Aorta: Unremarkable appearing aorta. No aortic dissection or aneurysm is seen.  Pulmonary Arteries: No filling defects are seen in the pulmonary arteries to suggest pulmonary embolus.  Lungs: There are areas of patchy ground-glass opacity with some central predominance with some additional areas of dense patchy opacity tending towards consolidation at the lung bases. The patient has bilateral moderate to large pleural effusions.  Bony Structures:Spine: Mild multilevel spondylotic changes are seen in the thoracic spine. Mild anterior compression deformity of L1 is demonstrated.  Ribs: The visualized ribs appear unremarkable.  Abdomen:  Incidental note is made of a left renal cyst arising from the superior pole of the visualized left kidney. The rest of the visualized upper abdominal organs appear unremarkable.  Impression:1. Mild cardiomegaly is seen. 2. No filling defects are seen in the pulmonary arteries to suggest pulmonary embolus. 3. There are areas of patchy ground-glass opacity with some central predominance with some additional areas of dense patchy opacity tending towards consolidation at the lung bases. The patient has bilateral moderate to large pleural effusions. These findings are suggestive of multifocal possibly atypical infectious pneumonia with probable concurrent areas of dense possibly compressive atelectasis at the lung bases. Correlate with clinical and laboratory findings as regards additional evaluation and follow-up to full resolution. 4. Details and other findings as discussed above.  I concur with the preliminary report above.    Assessment & Plan:     New-Onset Heart Failure with Reduced Ejection Fraction   - Etiology: unknown  - HF Status: NYHA Class II-III, ACC/AHA Stage C  - LVEF: 15-20% (TTE on 9/5/2023)   - Recommend initiating GDMT with Entresto and metoprolol   - if tolerating well, can start Spironolactone and Jardiance prior to discharge   - goal SBP is 90s-110  - goal HR <70  - Strict Is/Os and daily weights  - patient will need LHC given new-onset HFrEF but in the setting of PNA, will need to defer until outpatient   - recommend offering LifeVest to patient in the event he is willing to wear it     Mitral Regurgitation    - noted to be moderate to severe on TTE   - patient will need ROXANNE for further evaluation - will schedule outpatient     HTN   - BP not at goal   - recommend initiating GDMT at this time    - given tachycardia, recommend AGAINST hydralazine as PRN for BP control as this can also cause tachycardia - recommend PRN labetatlol    - Continue Low Na Diet and Exercise as tolerated     Thank you for  allowing us to participate in the care of this patient. We will continue to follow along for now.       Molly Serrano DO  [employer] Cardiology Fellow, PGY-6  09/05/2023 1:52 PM             Cardiology Attending  I evaluated Todd Bernal and discussed the patient's symptoms, findings, and management with the cardiology fellow on the Cardiology Service.  The patient is a 48 y.o. male with:   Chief Complaint   Patient presents with    Shortness of Breath     Pt arrives with c/o SOB that started about a week ago; pt denies any medical hx O2 sat 91% on room air         There is no problem list on file for this patient.    History reviewed. No pertinent surgical history.  Review of patient's allergies indicates:   Allergen Reactions    Phenergan [promethazine]          Current Facility-Administered Medications:     acetaminophen tablet 650 mg, 650 mg, Oral, Q4H PRN, Therese Santiago MD, 650 mg at 09/05/23 0431    azithromycin tablet 500 mg, 500 mg, Oral, Daily, Tello Melendez DO, 500 mg at 09/05/23 0835    enoxaparin injection 40 mg, 40 mg, Subcutaneous, Daily, Therese Santiago MD, 40 mg at 09/04/23 1725    [START ON 9/6/2023] furosemide tablet 40 mg, 40 mg, Oral, Daily, Bruno Johnson DO    hydrALAZINE injection 10 mg, 10 mg, Intravenous, Q4H PRN, Therese Santiago MD, 10 mg at 09/05/23 0430    [START ON 9/6/2023] metoprolol succinate (TOPROL-XL) 24 hr tablet 25 mg, 25 mg, Oral, Daily, Molly Serrano DO    piperacillin-tazobactam (ZOSYN) 4.5 g in dextrose 5 % in water (D5W) 100 mL IVPB (MB+), 4.5 g, Intravenous, Q8H, Tello Melendez DO, Last Rate: 25 mL/hr at 09/05/23 1453, 4.5 g at 09/05/23 1453    sacubitriL-valsartan 24-26 mg per tablet 1 tablet, 1 tablet, Oral, BID, Bruno Johnson DO    sodium chloride 0.9% flush 10 mL, 10 mL, Intravenous, PRN, Therese Santiago MD    Pharmacy to dose Vancomycin consult, , , Once **AND** vancomycin - pharmacy to dose, , Intravenous, pharmacy to manage frequency, Tello Melendez,  "DO    vancomycin 750 mg in dextrose 5 % (D5W) 250 mL IVPB, 750 mg, Intravenous, Q12H, Tello Melendez, , Stopped at 09/05/23 0635    [START ON 9/6/2023] venlafaxine 24 hr capsule 75 mg, 75 mg, Oral, Daily, Bruno Johnson,     Blood pressure (!) 142/102, pulse (!) 111, temperature 98.2 °F (36.8 °C), temperature source Oral, resp. rate 18, height 5' 5" (1.651 m), weight 53.5 kg (118 lb), SpO2 99 %.  Labs  BMP  Lab Results   Component Value Date     09/05/2023    K 3.8 09/05/2023    CO2 25 09/05/2023    BUN 14.8 09/05/2023    CREATININE 0.78 09/05/2023    CALCIUM 8.8 09/05/2023    EGFRNORACEVR >60 09/05/2023     Lab Results   Component Value Date    CREATININE 0.78 09/05/2023    CREATININE 0.76 09/04/2023    CREATININE 0.68 (L) 09/03/2023     BNP:    Lab Results   Component Value Date    .5 (H) 09/03/2023      Lab Results   Component Value Date    TROPONINI <0.010 09/04/2023    TROPONINI <0.010 09/03/2023     Lab Results   Component Value Date    WBC 11.09 09/05/2023    HGB 14.8 09/05/2023    HCT 43.5 09/05/2023    MCV 88.6 09/05/2023     09/05/2023         Lab Results   Component Value Date    HGB 14.8 09/05/2023    HGB 13.8 (L) 09/04/2023    HGB 13.8 (L) 09/03/2023     Lab Results   Component Value Date    CHOL 170 09/04/2023     Lab Results   Component Value Date    HDL 66 (H) 09/04/2023     No results found for: "LDLCALC"  No results found for: "DLDL"  Lab Results   Component Value Date    TRIG 80 09/04/2023       f1 No results found for: "CHOLHDL"    No components found for: "EKG 12-LEAD" No components found for: "ZXS93XCSC"  Echo  Results for orders placed during the hospital encounter of 09/03/23    Echo    Interpretation Summary    Left Ventricle: The left ventricle is moderately dilated. There is severely reduced systolic function with a visually estimated ejection fraction of 15 - 20%.    Left Atrium: Left atrium is dilated. LA volume index is 62ml/m2.    Right Ventricle: Normal right " ventricular cavity size. Systolic function is normal.    Right Atrium: Right atrium is mildly dilated.    Aortic Valve: The aortic valve is a trileaflet valve.    Mitral Valve: There is moderate to severe regurgitation.    Tricuspid Valve: There is mild to moderate regurgitation.    Pulmonary Artery: The estimated pulmonary artery systolic pressure is 50 mmHg.    IVC/SVC: Normal venous pressure at 3 mmHg.    Pericardium: There is a small effusion. Bilateral pleural effusions.    Stress test  No results found for this or any previous visit.    Coronary Angiogram  No results found for this or any previous visit.      Assessment  Cardiomyopathy   Moderate to severe MR    RECOMMENDATIONS:  Medications:  GDMT  Labs:  monitor electrolytes  Work up:  plan on outpatient LHC and ROXANNE

## 2023-09-05 NOTE — PLAN OF CARE
09/05/23 1526   Discharge Assessment   Assessment Type Discharge Planning Assessment   Confirmed/corrected address, phone number and insurance Yes   Confirmed Demographics Correct on Facesheet   Source of Information patient   Reason For Admission CHF (congestive heart failure)   R06.00 Dyspnea  I50.21 Acute systolic congestive heart failure  J18.9 Pneumonia of both lower lobes due to infectious organism   People in Home parent(s)   Facility Arrived From: Home   Do you expect to return to your current living situation? Yes   Do you have help at home or someone to help you manage your care at home? Yes   Who are your caregiver(s) and their phone number(s)? Elizabeth Arroyo (Mother)   [phone]; SisterSydni ([phone])   Prior to hospitilization cognitive status: Alert/Oriented   Current cognitive status: Alert/Oriented   Equipment Currently Used at Home none   Readmission within 30 days? No   Patient currently being followed by outpatient case management? No   Do you currently have service(s) that help you manage your care at home? No   Do you take prescription medications? Yes  ([facility])   Do you have prescription coverage? No   Do you have any problems affording any of your prescribed medications? TBD   Is the patient taking medications as prescribed? yes   Who is going to help you get home at discharge? Family   How do you get to doctors appointments? family or friend will provide   Are you on dialysis? No   DME Needed Upon Discharge  none   Discharge Plan discussed with: Patient;Sibling;Parent(s)   Name(s) and Number(s) Elizabeth Arroyo (Mother)   [phone]; SisterSydni ([phone])   Transition of Care Barriers None   Discharge Plan A Home with family   Physical Activity   On average, how many days per week do you engage in moderate to strenuous exercise (like a brisk walk)? 0 days   On average, how many minutes do you engage in exercise at this level? 0 min   Financial  Resource Strain   How hard is it for you to pay for the very basics like food, housing, medical care, and heating? Not hard   Housing Stability   In the last 12 months, was there a time when you were not able to pay the mortgage or rent on time? N   In the last 12 months, how many places have you lived? 1   In the last 12 months, was there a time when you did not have a steady place to sleep or slept in a shelter (including now)? N   Transportation Needs   In the past 12 months, has lack of transportation kept you from medical appointments or from getting medications? no   In the past 12 months, has lack of transportation kept you from meetings, work, or from getting things needed for daily living? No   Food Insecurity   Within the past 12 months, you worried that your food would run out before you got the money to buy more. Never true   Within the past 12 months, the food you bought just didn't last and you didn't have money to get more. Never true   Stress   Do you feel stress - tense, restless, nervous, or anxious, or unable to sleep at night because your mind is troubled all the time - these days? Not at all   Social Connections   In a typical week, how many times do you talk on the phone with family, friends, or neighbors? More than 3   How often do you get together with friends or relatives? More than 3   How often do you attend Yarsanism or Jehovah's witness services? Never   Do you belong to any clubs or organizations such as Yarsanism groups, unions, fraternal or athletic groups, or school groups? No   How often do you attend meetings of the clubs or organizations you belong to? Never   Are you , , , , never , or living with a partner? Never marrie   Alcohol Use   Q1: How often do you have a drink containing alcohol? Never   Q2: How many drinks containing alcohol do you have on a typical day when you are drinking? None   Q3: How often do you have six or more drinks on one occasion?  Never   OTHER   Name(s) of People in Home Elizabeth Arroyo (Mother)   [phone]     Pt single with 2 adult children; Resides with mother, Elizabeth; Independent with ADL's; Pt planning to submit disability application-Unemployed with no income; Pending M/D application.

## 2023-09-05 NOTE — PROGRESS NOTES
[facility] Medicine  Progress Note      Patient Name: Todd Bernal  : 1975  MRN: [id number]  Patient Class: IP- Inpatient   Admission Date: 9/3/2023   Length of Stay: 1  Admitting Service: Hospital Medicine  Attending Physician: Lv Montiel MD  Resident: Al  Intern: Mary   PCP: Julia, Primary Doctor    CHIEF COMPLAINT     Chief Complaint   Patient presents with    Shortness of Breath     Pt arrives with c/o SOB that started about a week ago; pt denies any medical hx O2 sat 91% on room air         HOSPITAL COURSE     Brief HPI:  Todd Bernal is a 48 y.o.male with no significant past medical history presented to the ED on 9/3/2023  with a primary complaint of Shortness of Breath (Pt arrives with c/o SOB that started about a week ago; pt denies any medical hx O2 sat 91% on room air)  . 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 He reports that for 2 days after his exposure he had SOB, and then his symptoms resolved. Approximately 3 days ago he returned to working in garage, and then had a similar episode of SOB that has gotten progressively worse. 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 He endorses a dry cough. He denies any fever, chills, nausea, or vomiting. He reports no recent sick contacts. He reports that his brother was working in the same conditions and did not have any symptoms. He denies ever having any similar episodes of SOB in the past, and denies any history of asthma or allergic reactions.   In the ED the patient was afebrile, tachycardic to 133, tachypnic to 30. BP was elevated to 160/123. O2 was lower 90s on RA. Electrolytes were wnl, Cr was 0.68.  CBC was non remarkable. Troponin was negative, BNP was 835.5. D-dimer was 0.80. CTA chest was  negative for PE, but showed some mild cardiomegaly, and patchy ground-glass opacities tending towards consolidation at the lung bases suggestive of multifocal possibly atypical infectious pneumonia. In the ED he was placed on CPAP, nitro drip, and given 40 mg Lasix. The patient was admitted for workup of new onset CHF vs pneumonia.     Interval History:  No acute events documented overnight, elevated SBP ranging 140s-150s, otherwise VSS and AF. Patient reports shortness of breath overnight which has since resolved. Patient is asymptomatic this AM. Echo pending.     OBJECTIVE/PHYSICAL EXAM     VITAL SIGNS (MOST RECENT):  Temp: 98.2 °F (36.8 °C) (09/05/23 0344)  Pulse: (!) 116 (09/05/23 0344)  Resp: 18 (09/05/23 0344)  BP: (!) 141/94 (09/05/23 0536)  SpO2: (!) 94 % (09/05/23 0500) VITAL SIGNS (24 HOUR RANGE):  Temp:  [98.2 °F (36.8 °C)-98.3 °F (36.8 °C)]   Pulse:  [113-116]   Resp:  [18]   BP: (126-151)/()   SpO2:  [92 %-96 %]      Physical Exam  Vitals and nursing note reviewed.   Constitutional:       General: He is not in acute distress.     Appearance: He is not ill-appearing or diaphoretic.   HENT:      Nose: No congestion or rhinorrhea.   Neck:      Vascular: No hepatojugular reflux or JVD.   Cardiovascular:      Rate and Rhythm: Regular rhythm. Tachycardia present.   Pulmonary:      Effort: Pulmonary effort is normal. No respiratory distress.      Breath sounds: Normal breath sounds. No wheezing, rhonchi or rales.      Comments: 3L NC, SpO2>94%  No pulmonary crackles   Musculoskeletal:      Right lower leg: No edema.      Left lower leg: No edema.   Skin:     Capillary Refill: Capillary refill takes less than 2 seconds.   Neurological:      Mental Status: He is alert and oriented to person, place, and time.         LABS/MICRO/MEDS/DIAGNOSTICS     LABS  CBC  Recent Labs     09/04/23  0424 09/05/23  0328   WBC 8.58 11.09   RBC 4.56* 4.91   HGB 13.8* 14.8   HCT 41.5* 43.5   MCV 91.0 88.6   MCH 30.3 30.1  "  MCHC 33.3 34.0   RDW 12.7 12.7    342     Anemia  No results for input(s): "HAPTOGLOBIN", "FERRITIN", "IRON", "TIBC" in the last 72 hours.  Coags  Recent Labs     09/03/23 2243   INR 1.0   D-DIMER 0.80*     Cardiac  Recent Labs     09/03/23 2243 09/04/23  1152   .5*  --      --    TROPONINI <0.010 <0.010     ABG/Lactate  Recent Labs     09/04/23  0605   PH 7.490*   PCO2 37.0   PO2 58.0*   HCO3 28.2*       BMP  Recent Labs     09/04/23 0424 09/05/23  0328    139   K 3.5 3.8   CHLORIDE 104 105   CO2 25 25   BUN 12.0 14.8   CREATININE 0.76 0.78   GLUCOSE 142* 108*   CALCIUM 8.7 8.8   MG 1.90 2.10   PHOS 2.9 3.5     LFTs  Recent Labs     09/04/23 0424 09/05/23  0328   ALBUMIN 3.1* 3.1*   GLOBULIN 3.3 3.6*   ALKPHOS 88 88   ALT 18 15   AST 18 15   BILITOT 0.4 0.6     Inflammatory Markers  No results for input(s): "CRP", "LDH", "ESR" in the last 72 hours.  Lipid  Recent Labs     09/04/23 0424   CHOL 170   TRIG 80   LDL 88.00   VLDL 16   HDL 66*     Diabetes  Recent Labs     09/03/23 2243 09/04/23 0424 09/05/23 0328   HGBA1C  --  5.4  --    GLUCOSE 103* 142* 108*     Thyroid  Recent Labs     09/03/23 2243   TSH 2.131          MICROBIOLOGY  Microbiology Results (last 7 days)       Procedure Component Value Units Date/Time    Respiratory Culture [[id number]]     Order Status: Sent Specimen: Sputum     Blood Culture #2 **CANNOT BE ORDERED STAT** [[id number]] Collected: 09/04/23 0143    Order Status: Resulted Specimen: Blood from Hand, Right Updated: 09/04/23 0148    Blood Culture #1 **CANNOT BE ORDERED STAT** [[id number]] Collected: 09/04/23 0131    Order Status: Resulted Specimen: Blood from Arm, Left Updated: 09/04/23 0146               MEDICATIONS   azithromycin  500 mg Oral Daily    enoxparin  40 mg Subcutaneous Daily    piperacillin-tazobactam (Zosyn) IV (PEDS and ADULTS) (extended infusion is not appropriate)  4.5 g Intravenous Q8H    vancomycin (VANCOCIN) IV (PEDS and ADULTS)  750 mg " Intravenous Q12H    venlafaxine  75 mg Oral Daily         INFUSIONS         DIAGNOSTIC TESTS  CTA Chest Non-Coronary (PE Studies)   Final Result   Technique: CT Scan of the chest was performed with intravenous contrast with direct axial images as well as sagittal and coronal reconstruction images pulmonary embolus protocol.  Dosage Information: Automated Exposure Control was utilized 178.05 mGy.cm.  Comparison: None.  Clinical History: Pulmonary embolism (PE) suspected, positive D-dimer.  Findings:Artifact: Streak artifact is seen in the right anterior chest wall partially obscuring the adjacent anatomy.  Soft Tissues: The visualized soft tissues of the chest wall appear unremarkable.  Axilla: A few mildly prominent lymph nodes are seen in the left and right axilla.  Neck: The visualized soft tissues of the neck appear unremarkable.  Mediastinum: The mediastinal structures are within normal limits.  Heart: Mild cardiomegaly is seen.  Aorta: Unremarkable appearing aorta. No aortic dissection or aneurysm is seen.  Pulmonary Arteries: No filling defects are seen in the pulmonary arteries to suggest pulmonary embolus.  Lungs: There   are areas of patchy ground-glass opacity with some central predominance with some additional areas of dense patchy opacity tending towards consolidation at the lung bases. The patient has bilateral moderate to large pleural effusions.  Bony Structures:Spine: Mild multilevel spondylotic changes are seen in the thoracic spine. Mild anterior compression deformity of L1 is demonstrated.  Ribs: The visualized ribs appear unremarkable.  Abdomen: Incidental note is made of a left renal cyst arising from the superior pole of the visualized left kidney. The rest of the visualized upper abdominal organs appear unremarkable.  Impression:1. Mild cardiomegaly is seen. 2. No filling defects are seen in the pulmonary arteries to suggest pulmonary embolus. 3. There are areas of patchy ground-glass opacity  "with some central predominance with some additional areas of dense patchy opacity tending towards consolidation at the lung bases. The patient has bilateral moderate to large pleural effusions. These   findings are suggestive of multifocal possibly atypical infectious pneumonia with probable concurrent areas of dense possibly compressive atelectasis at the lung bases. Correlate with clinical and laboratory findings as regards additional evaluation and follow-up to full resolution. 4. Details and other findings as discussed above.  I concur with the preliminary report above.         Electronically signed by: Shaun Salazar   Date:    09/04/2023   Time:    08:49      X-Ray Chest 1 View   ED Interpretation   Bilateral pulmonary vascular congestion with bilateral lower lobe consolidations / pulmonary edema      Final Result      Findings suggestive of fluid overload/heart failure         Electronically signed by: Kenneth Alcantara MD   Date:    09/04/2023   Time:    08:57           No results found for: "EF"       ASSESSMENT/PLAN   Shortness of Breath   B/L Pleural Effusions on CXR  CHF vs. PNA vs COPD   -Elevated , troponin negative, EKG unremarkable in ED   -Received 1 x Lasix 40mg IV in ED, and another Lasix 40mg IV following admission; diuresed well at 2.85L urine output over last 24 hours   -Hx of 1PPD x 20 years, quit 4 years ago  -Initial ABG consistent with alkalosis, does not appear to be consistent with COPD   -Echo pending this AM, will consult cardiology if confirmed CHF   -Repeat CXR to assess size of pleural effusions, will plan for thoracentesis if still large enough   -Strict I&Os and daily weights   -Sodium restriction   -Consider starting Duonebs, inhalation treatment and IS if Echo is negative   -Lipid panel unremarkable, A1c 5.4, HIV/Syphilis negative     SIRS criteria, VS and possible PNA   -BC x 2, RC pending   -Continue Vanc + Zosyn + Azithromycin     Elevated BP   -PRN antihypertensives in " place     Access: Peripheral  Antibiotics: Vanc + Zosyn +Azithro (started 9/4)  Diet: Regular   DVT Prophylaxis: Lovenox   GI Prophylaxis: None   Fluids: None     Dispo: 49 yo M admitted to telemetry for work-up of PNA vs. COPD vs. less likely new onset CHF. Continue to monitor.       Lg Hernandez MD  [facility] Internal Medicine [facility]

## 2023-09-06 PROBLEM — I50.20 HFREF (HEART FAILURE WITH REDUCED EJECTION FRACTION): Chronic | Status: ACTIVE | Noted: 2023-09-06

## 2023-09-06 PROBLEM — I34.0 NONRHEUMATIC MITRAL VALVE REGURGITATION: Chronic | Status: ACTIVE | Noted: 2023-09-06

## 2023-09-06 PROBLEM — I50.23 ACUTE ON CHRONIC HFREF (HEART FAILURE WITH REDUCED EJECTION FRACTION): Status: ACTIVE | Noted: 2023-09-06

## 2023-09-06 LAB
ALBUMIN FLD-MCNC: 2 GM/DL
ALBUMIN SERPL-MCNC: 3 G/DL (ref 3.5–5)
ALBUMIN/GLOB SERPL: 0.8 RATIO (ref 1.1–2)
ALP SERPL-CCNC: 82 UNIT/L (ref 40–150)
ALT SERPL-CCNC: 13 UNIT/L (ref 0–55)
AST SERPL-CCNC: 15 UNIT/L (ref 5–34)
BASOPHILS # BLD AUTO: 0.07 X10(3)/MCL
BASOPHILS NFR BLD AUTO: 0.8 %
BILIRUB SERPL-MCNC: 0.5 MG/DL
BUN SERPL-MCNC: 18.3 MG/DL (ref 8.9–20.6)
CALCIUM SERPL-MCNC: 9 MG/DL (ref 8.4–10.2)
CHLORIDE SERPL-SCNC: 105 MMOL/L (ref 98–107)
CLARITY BODY FLUID (OHS): NORMAL
CO2 SERPL-SCNC: 26 MMOL/L (ref 22–29)
COLOR BODY FLUID (OHS): YELLOW
CREAT SERPL-MCNC: 0.7 MG/DL (ref 0.73–1.18)
EOSINOPHIL # BLD AUTO: 0.24 X10(3)/MCL (ref 0–0.9)
EOSINOPHIL NFR BLD AUTO: 2.7 %
ERYTHROCYTE [DISTWIDTH] IN BLOOD BY AUTOMATED COUNT: 12.9 % (ref 11.5–17)
GFR SERPLBLD CREATININE-BSD FMLA CKD-EPI: >60 MLS/MIN/1.73/M2
GLOBULIN SER-MCNC: 3.6 GM/DL (ref 2.4–3.5)
GLUCOSE FLD-MCNC: 123 MG/DL
GLUCOSE SERPL-MCNC: 101 MG/DL (ref 74–100)
HCT VFR BLD AUTO: 43.3 % (ref 42–52)
HGB BLD-MCNC: 14.5 G/DL (ref 14–18)
IMM GRANULOCYTES # BLD AUTO: 0.02 X10(3)/MCL (ref 0–0.04)
IMM GRANULOCYTES NFR BLD AUTO: 0.2 %
LDH FLD-CCNC: 157 U/L
LDH SERPL-CCNC: 293 U/L (ref 125–220)
LYMPHOCYTE MANUAL BF (OHS): 79 %
LYMPHOCYTES # BLD AUTO: 2.15 X10(3)/MCL (ref 0.6–4.6)
LYMPHOCYTES NFR BLD AUTO: 24.5 %
MACROPHAGES, FLUID MAN COUNT (OHS): 2
MAGNESIUM SERPL-MCNC: 2.1 MG/DL (ref 1.6–2.6)
MCH RBC QN AUTO: 30.3 PG (ref 27–31)
MCHC RBC AUTO-ENTMCNC: 33.5 G/DL (ref 33–36)
MCV RBC AUTO: 90.4 FL (ref 80–94)
MESOTHELIAL CELLS, FLUID MAN COUNT (OHS): 2
MONOCYTE MANUAL BF (OHS): 11 %
MONOCYTES # BLD AUTO: 0.91 X10(3)/MCL (ref 0.1–1.3)
MONOCYTES NFR BLD AUTO: 10.4 %
NEUTROPHILS # BLD AUTO: 5.38 X10(3)/MCL (ref 2.1–9.2)
NEUTROPHILS MAN BF (OHS): 10 %
NEUTROPHILS NFR BLD AUTO: 61.4 %
NRBC BLD AUTO-RTO: 0 %
PHOSPHATE SERPL-MCNC: 4 MG/DL (ref 2.3–4.7)
PLATELET # BLD AUTO: 331 X10(3)/MCL (ref 130–400)
PMV BLD AUTO: 9.8 FL (ref 7.4–10.4)
POTASSIUM SERPL-SCNC: 3.6 MMOL/L (ref 3.5–5.1)
PROT FLD-MCNC: 2.6 GM/DL
PROT SERPL-MCNC: 6.6 GM/DL (ref 6.4–8.3)
PROT SERPL-MCNC: 6.6 GM/DL (ref 6.4–8.3)
RBC # BLD AUTO: 4.79 X10(6)/MCL (ref 4.7–6.1)
RBC COUNT BODY FLUID (OHS): 3000 /UL
SODIUM SERPL-SCNC: 141 MMOL/L (ref 136–145)
WBC # FLD AUTO: 730 /UL
WBC # SPEC AUTO: 8.77 X10(3)/MCL (ref 4.5–11.5)

## 2023-09-06 PROCEDURE — C1887 CATHETER, GUIDING: HCPCS | Performed by: INTERNAL MEDICINE

## 2023-09-06 PROCEDURE — 87102 FUNGUS ISOLATION CULTURE: CPT | Performed by: INTERNAL MEDICINE

## 2023-09-06 PROCEDURE — 99152 MOD SED SAME PHYS/QHP 5/>YRS: CPT | Performed by: INTERNAL MEDICINE

## 2023-09-06 PROCEDURE — 82945 GLUCOSE OTHER FLUID: CPT | Performed by: INTERNAL MEDICINE

## 2023-09-06 PROCEDURE — 83735 ASSAY OF MAGNESIUM: CPT | Performed by: STUDENT IN AN ORGANIZED HEALTH CARE EDUCATION/TRAINING PROGRAM

## 2023-09-06 PROCEDURE — 25500020 PHARM REV CODE 255: Performed by: INTERNAL MEDICINE

## 2023-09-06 PROCEDURE — 83615 LACTATE (LD) (LDH) ENZYME: CPT | Performed by: INTERNAL MEDICINE

## 2023-09-06 PROCEDURE — 25000003 PHARM REV CODE 250: Performed by: INTERNAL MEDICINE

## 2023-09-06 PROCEDURE — 93458 L HRT ARTERY/VENTRICLE ANGIO: CPT | Performed by: INTERNAL MEDICINE

## 2023-09-06 PROCEDURE — 63600175 PHARM REV CODE 636 W HCPCS: Performed by: STUDENT IN AN ORGANIZED HEALTH CARE EDUCATION/TRAINING PROGRAM

## 2023-09-06 PROCEDURE — 84311 SPECTROPHOTOMETRY: CPT | Performed by: INTERNAL MEDICINE

## 2023-09-06 PROCEDURE — 25000003 PHARM REV CODE 250: Performed by: STUDENT IN AN ORGANIZED HEALTH CARE EDUCATION/TRAINING PROGRAM

## 2023-09-06 PROCEDURE — 87801 DETECT AGNT MULT DNA AMPLI: CPT | Performed by: INTERNAL MEDICINE

## 2023-09-06 PROCEDURE — 87205 SMEAR GRAM STAIN: CPT | Performed by: INTERNAL MEDICINE

## 2023-09-06 PROCEDURE — 82042 OTHER SOURCE ALBUMIN QUAN EA: CPT | Performed by: INTERNAL MEDICINE

## 2023-09-06 PROCEDURE — 27000221 HC OXYGEN, UP TO 24 HOURS

## 2023-09-06 PROCEDURE — 80053 COMPREHEN METABOLIC PANEL: CPT | Performed by: STUDENT IN AN ORGANIZED HEALTH CARE EDUCATION/TRAINING PROGRAM

## 2023-09-06 PROCEDURE — 21400001 HC TELEMETRY ROOM

## 2023-09-06 PROCEDURE — 87070 CULTURE OTHR SPECIMN AEROBIC: CPT | Performed by: INTERNAL MEDICINE

## 2023-09-06 PROCEDURE — 63600175 PHARM REV CODE 636 W HCPCS: Performed by: INTERNAL MEDICINE

## 2023-09-06 PROCEDURE — 84100 ASSAY OF PHOSPHORUS: CPT | Performed by: STUDENT IN AN ORGANIZED HEALTH CARE EDUCATION/TRAINING PROGRAM

## 2023-09-06 PROCEDURE — 87075 CULTR BACTERIA EXCEPT BLOOD: CPT | Performed by: INTERNAL MEDICINE

## 2023-09-06 PROCEDURE — 25000003 PHARM REV CODE 250

## 2023-09-06 PROCEDURE — 89051 BODY FLUID CELL COUNT: CPT | Performed by: INTERNAL MEDICINE

## 2023-09-06 PROCEDURE — 87206 SMEAR FLUORESCENT/ACID STAI: CPT | Performed by: INTERNAL MEDICINE

## 2023-09-06 PROCEDURE — 84155 ASSAY OF PROTEIN SERUM: CPT | Performed by: INTERNAL MEDICINE

## 2023-09-06 PROCEDURE — 85025 COMPLETE CBC W/AUTO DIFF WBC: CPT | Performed by: STUDENT IN AN ORGANIZED HEALTH CARE EDUCATION/TRAINING PROGRAM

## 2023-09-06 PROCEDURE — C1769 GUIDE WIRE: HCPCS | Performed by: INTERNAL MEDICINE

## 2023-09-06 PROCEDURE — 88305 TISSUE EXAM BY PATHOLOGIST: CPT | Mod: TC | Performed by: INTERNAL MEDICINE

## 2023-09-06 PROCEDURE — 94761 N-INVAS EAR/PLS OXIMETRY MLT: CPT

## 2023-09-06 PROCEDURE — C1894 INTRO/SHEATH, NON-LASER: HCPCS | Performed by: INTERNAL MEDICINE

## 2023-09-06 PROCEDURE — 84157 ASSAY OF PROTEIN OTHER: CPT | Performed by: INTERNAL MEDICINE

## 2023-09-06 RX ORDER — FENTANYL CITRATE 50 UG/ML
INJECTION, SOLUTION INTRAMUSCULAR; INTRAVENOUS
Status: DISCONTINUED | OUTPATIENT
Start: 2023-09-06 | End: 2023-09-06 | Stop reason: HOSPADM

## 2023-09-06 RX ORDER — SPIRONOLACTONE 25 MG/1
25 TABLET ORAL DAILY
Status: DISCONTINUED | OUTPATIENT
Start: 2023-09-06 | End: 2023-09-08 | Stop reason: HOSPADM

## 2023-09-06 RX ORDER — MIDAZOLAM HYDROCHLORIDE 5 MG/ML
INJECTION INTRAMUSCULAR; INTRAVENOUS
Status: DISCONTINUED | OUTPATIENT
Start: 2023-09-06 | End: 2023-09-06 | Stop reason: HOSPADM

## 2023-09-06 RX ORDER — SODIUM CHLORIDE 0.9 % (FLUSH) 0.9 %
10 SYRINGE (ML) INJECTION
Status: DISCONTINUED | OUTPATIENT
Start: 2023-09-06 | End: 2023-09-08 | Stop reason: HOSPADM

## 2023-09-06 RX ORDER — LEVOFLOXACIN 750 MG/1
750 TABLET ORAL DAILY
Status: DISCONTINUED | OUTPATIENT
Start: 2023-09-06 | End: 2023-09-08 | Stop reason: HOSPADM

## 2023-09-06 RX ORDER — LIDOCAINE HYDROCHLORIDE 10 MG/ML
INJECTION INFILTRATION; PERINEURAL
Status: DISCONTINUED | OUTPATIENT
Start: 2023-09-06 | End: 2023-09-06 | Stop reason: HOSPADM

## 2023-09-06 RX ORDER — METOPROLOL SUCCINATE 50 MG/1
50 TABLET, EXTENDED RELEASE ORAL DAILY
Status: DISCONTINUED | OUTPATIENT
Start: 2023-09-07 | End: 2023-09-07

## 2023-09-06 RX ORDER — HEPARIN SODIUM 5000 [USP'U]/ML
INJECTION, SOLUTION INTRAVENOUS; SUBCUTANEOUS
Status: DISCONTINUED | OUTPATIENT
Start: 2023-09-06 | End: 2023-09-06 | Stop reason: HOSPADM

## 2023-09-06 RX ORDER — ACETAMINOPHEN 325 MG/1
650 TABLET ORAL EVERY 4 HOURS PRN
Status: DISCONTINUED | OUTPATIENT
Start: 2023-09-06 | End: 2023-09-08 | Stop reason: HOSPADM

## 2023-09-06 RX ORDER — SODIUM CHLORIDE 9 MG/ML
INJECTION, SOLUTION INTRAVENOUS ONCE
Status: CANCELLED | OUTPATIENT
Start: 2023-09-06 | End: 2023-09-06

## 2023-09-06 RX ORDER — FUROSEMIDE 20 MG/1
20 TABLET ORAL DAILY
Status: DISCONTINUED | OUTPATIENT
Start: 2023-09-07 | End: 2023-09-08 | Stop reason: HOSPADM

## 2023-09-06 RX ORDER — ONDANSETRON 4 MG/1
8 TABLET, ORALLY DISINTEGRATING ORAL EVERY 8 HOURS PRN
Status: DISCONTINUED | OUTPATIENT
Start: 2023-09-06 | End: 2023-09-08 | Stop reason: HOSPADM

## 2023-09-06 RX ORDER — NITROGLYCERIN 20 MG/100ML
INJECTION INTRAVENOUS
Status: COMPLETED | OUTPATIENT
Start: 2023-09-06 | End: 2023-09-06

## 2023-09-06 RX ORDER — DIPHENHYDRAMINE HYDROCHLORIDE 50 MG/ML
INJECTION INTRAMUSCULAR; INTRAVENOUS
Status: DISCONTINUED | OUTPATIENT
Start: 2023-09-06 | End: 2023-09-06 | Stop reason: HOSPADM

## 2023-09-06 RX ORDER — METOPROLOL SUCCINATE 25 MG/1
25 TABLET, EXTENDED RELEASE ORAL ONCE
Status: COMPLETED | OUTPATIENT
Start: 2023-09-06 | End: 2023-09-06

## 2023-09-06 RX ORDER — DIPHENHYDRAMINE HCL 25 MG
50 CAPSULE ORAL
Status: CANCELLED | OUTPATIENT
Start: 2023-09-06

## 2023-09-06 RX ORDER — DIAZEPAM 5 MG/1
5 TABLET ORAL ONCE
Status: DISCONTINUED | OUTPATIENT
Start: 2023-09-06 | End: 2023-09-08 | Stop reason: HOSPADM

## 2023-09-06 RX ADMIN — METOPROLOL SUCCINATE 25 MG: 25 TABLET, EXTENDED RELEASE ORAL at 08:09

## 2023-09-06 RX ADMIN — SPIRONOLACTONE 25 MG: 25 TABLET ORAL at 02:09

## 2023-09-06 RX ADMIN — SACUBITRIL AND VALSARTAN 1 TABLET: 24; 26 TABLET, FILM COATED ORAL at 09:09

## 2023-09-06 RX ADMIN — FUROSEMIDE 40 MG: 20 TABLET ORAL at 08:09

## 2023-09-06 RX ADMIN — LEVOFLOXACIN 750 MG: 750 TABLET, FILM COATED ORAL at 08:09

## 2023-09-06 RX ADMIN — ENOXAPARIN SODIUM 40 MG: 40 INJECTION SUBCUTANEOUS at 05:09

## 2023-09-06 RX ADMIN — SACUBITRIL AND VALSARTAN 1 TABLET: 24; 26 TABLET, FILM COATED ORAL at 08:09

## 2023-09-06 RX ADMIN — METOPROLOL SUCCINATE 25 MG: 25 TABLET, EXTENDED RELEASE ORAL at 02:09

## 2023-09-06 RX ADMIN — VANCOMYCIN HYDROCHLORIDE 750 MG: 750 INJECTION, POWDER, LYOPHILIZED, FOR SOLUTION INTRAVENOUS at 05:09

## 2023-09-06 NOTE — PROGRESS NOTES
"Cardiology Consult Note     Cardiology Attending: Dr. Fonseca   Cardiology Fellow: Molly Serrano DO     Date of Admit: 9/3/2023  Date of Consult: 9/6/2023    Reason for Consultation:     "New-Onset HFrEF"    History of Present Illness:      Brief HPI: Todd Bernal is a 48 y.o. male with no significant PMH who presented to the ED on 9/4 with complaints of SOB x1 week. 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 SOB started after exposure. On arrival noted to have elevated BNP and Cardiomegaly. Subsequent workup showed severely reduced EF with moderate to severe MR. Patient also endorses family history of premature CAD. Cardiology has been consulted for further evaluation.     Interval History:  Patient was seen and examined this morning. Reports feeling much better since having fluid removed from his left lung. We had an extended discussion regarding LHC and ROXANNE. All questions were answered.     Past Medical History:   History reviewed. No pertinent past medical history.  Surgical History:   History reviewed. No pertinent surgical history.  Allergies:     Review of patient's allergies indicates:   Allergen Reactions    Phenergan [promethazine]      Family History:   History reviewed. No pertinent family history.  Social History:     Social History     Tobacco Use    Smoking status: Never    Smokeless tobacco: Never   Substance Use Topics    Alcohol use: Never    Drug use: Never     Review of Systems:     ROS completed and negative except as above.     Medications:     Home Medications:  Prior to Admission medications    Not on File       Current/Inpatient Medications:  Infusions:    Scheduled:   diazePAM  5 mg Oral Once    enoxparin  40 mg Subcutaneous Daily    [START ON 9/7/2023] furosemide  20 mg Oral Daily    levoFLOXacin  750 mg Oral Daily    metoprolol succinate  25 mg Oral Daily    sacubitriL-valsartan  1 tablet Oral BID    spironolactone  25 mg Oral Daily    venlafaxine  75 mg Oral Daily " "    PRN:  acetaminophen, labetalol, sodium chloride 0.9%, sodium chloride 0.9%, sodium chloride 0.9%    Objective:     24-hour Vitals:   Temp:  [97.6 °F (36.4 °C)-98.2 °F (36.8 °C)] 97.9 °F (36.6 °C)  Pulse:  [] 91  Resp:  [18] 18  SpO2:  [92 %-99 %] 92 %  BP: (126-142)/() 126/88    Vitals: /88   Pulse 91   Temp 97.9 °F (36.6 °C) (Oral)   Resp 18   Ht 5' 5" (1.651 m)   Wt 53.2 kg (117 lb 3.2 oz)   SpO2 (!) 92%   BMI 19.50 kg/m²     Intake/Output Summary (Last 24 hours) at 9/6/2023 1113  Last data filed at 9/6/2023 0514  Gross per 24 hour   Intake 1254.3 ml   Output 1200 ml   Net 54.3 ml         Physical Exam  Constitutional:       Appearance: Normal appearance.   HENT:      Head: Normocephalic and atraumatic.   Eyes:      Conjunctiva/sclera: Conjunctivae normal.   Neck:      Vascular: No carotid bruit.   Cardiovascular:      Rate and Rhythm: Normal rate and regular rhythm.   Pulmonary:      Effort: Pulmonary effort is normal.      Breath sounds: Normal breath sounds.   Abdominal:      General: Bowel sounds are normal.      Palpations: Abdomen is soft.   Musculoskeletal:      Right lower leg: No edema.      Left lower leg: No edema.   Skin:     General: Skin is warm and dry.   Neurological:      Mental Status: He is alert. Mental status is at baseline.   Psychiatric:         Mood and Affect: Mood normal.         Thought Content: Thought content normal.         Judgment: Judgment normal.        Labs:   I have reviewed the following labs below:    Recent Labs   Lab 09/03/23  2243 09/04/23  0424 09/04/23  1152 09/05/23  0328 09/06/23  0350   WBC 10.15 8.58  --  11.09 8.77   HGB 13.8* 13.8*  --  14.8 14.5   HCT 42.2 41.5*  --  43.5 43.3    321  --  342 331    139  --  139 141   K 3.8 3.5  --  3.8 3.6   CO2 25 25  --  25 26   BUN 15.7 12.0  --  14.8 18.3   CREATININE 0.68* 0.76  --  0.78 0.70*   CALCIUM 8.8 8.7  --  8.8 9.0   MG 1.80 1.90  --  2.10 2.10   PHOS  --  2.9  --  3.5 4.0 "   ALBUMIN 3.3* 3.1*  --  3.1* 3.0*   BILITOT 0.3 0.4  --  0.6 0.5   AST 21 18  --  15 15   ALT 19 18  --  15 13   ALKPHOS 93 88  --  88 82   PTT 31.0  --   --   --   --    INR 1.0  --   --   --   --    TROPONINI <0.010  --  <0.010  --   --    .5*  --   --   --   --        Cardiovascular Studies/Imaging:   I have reviewed the following studies below:    TTE (9/5/2023):   Left Ventricle: The left ventricle is moderately dilated. There is severely reduced systolic function with a visually estimated ejection fraction of 15 - 20%.    Left Atrium: Left atrium is dilated. LA volume index is 62ml/m2.    Right Ventricle: Normal right ventricular cavity size. Systolic function is normal.    Right Atrium: Right atrium is mildly dilated.    Aortic Valve: The aortic valve is a trileaflet valve.    Mitral Valve: There is moderate to severe regurgitation.    Tricuspid Valve: There is mild to moderate regurgitation.    Pulmonary Artery: The estimated pulmonary artery systolic pressure is 50 mmHg.    IVC/SVC: Normal venous pressure at 3 mmHg.    Pericardium: There is a small effusion. Bilateral pleural effusions.      Imaging:   X-Ray Chest 1 View - 9/5/2023  EXAMINATION: XR CHEST 1 VIEW CPT 75952 CLINICAL HISTORY: pna; COMPARISON: September 3, 2023 FINDINGS: Examination reveals cardiomediastinal silhouette and pleuroparenchymal changes to be essentially unchanged as compared with the previous exam. Bilateral pleural effusions are again identified. Increased left retrocardiac density and silhouetting of the left hemidiaphragm is seen which might be related to pleural fluid and or represent an infiltrate/atelectasis       11:47    X-Ray Chest 1 View - 9/4/2023  EXAMINATION: XR CHEST 1 VIEW CLINICAL HISTORY: Dyspnea; COMPARISON: None FINDINGS: Single view of the chest shows bilateral effusions with dependent patchy ground-glass opacities and central vascular congestion.  No pneumothorax.  Heart is upper normal in size. Findings  suggestive of fluid overload/heart failure       CTA CHEST NON CORONARY (PE STUDIES) - 9/4/2023    Technique: CT Scan of the chest was performed with intravenous contrast with direct axial images as well as sagittal and coronal reconstruction images pulmonary embolus protocol.  Dosage Information: Automated Exposure Control was utilized 178.05 mGy.cm.  Comparison: None.  Clinical History: Pulmonary embolism (PE) suspected, positive D-dimer.  Findings:Artifact: Streak artifact is seen in the right anterior chest wall partially obscuring the adjacent anatomy.  Soft Tissues: The visualized soft tissues of the chest wall appear unremarkable.  Axilla: A few mildly prominent lymph nodes are seen in the left and right axilla.  Neck: The visualized soft tissues of the neck appear unremarkable.  Mediastinum: The mediastinal structures are within normal limits.  Heart: Mild cardiomegaly is seen.  Aorta: Unremarkable appearing aorta. No aortic dissection or aneurysm is seen.  Pulmonary Arteries: No filling defects are seen in the pulmonary arteries to suggest pulmonary embolus.  Lungs: There are areas of patchy ground-glass opacity with some central predominance with some additional areas of dense patchy opacity tending towards consolidation at the lung bases. The patient has bilateral moderate to large pleural effusions.  Bony Structures:Spine: Mild multilevel spondylotic changes are seen in the thoracic spine. Mild anterior compression deformity of L1 is demonstrated.  Ribs: The visualized ribs appear unremarkable.  Abdomen: Incidental note is made of a left renal cyst arising from the superior pole of the visualized left kidney. The rest of the visualized upper abdominal organs appear unremarkable.  Impression:1. Mild cardiomegaly is seen. 2. No filling defects are seen in the pulmonary arteries to suggest pulmonary embolus. 3. There are areas of patchy ground-glass opacity with some central predominance with some  additional areas of dense patchy opacity tending towards consolidation at the lung bases. The patient has bilateral moderate to large pleural effusions. These findings are suggestive of multifocal possibly atypical infectious pneumonia with probable concurrent areas of dense possibly compressive atelectasis at the lung bases. Correlate with clinical and laboratory findings as regards additional evaluation and follow-up to full resolution. 4. Details and other findings as discussed above.  I concur with the preliminary report above.    Assessment & Plan:     New-Onset Heart Failure with Reduced Ejection Fraction    - Etiology: unknown   - HF Status: NYHA Class II-III, ACC/AHA Stage C   - LVEF: 15-20% (TTE on 9/5/2023)    - Agree with Entresto - if SBP still elevated tomorrow morning, recommend increasing Entresto to 49/51mg (goal SBP 90s-110)   - Recommend increasing metoprolol to 50mg (goal HR <70)     - agree with PO diuretics as patient appears euvolemic on exam    - If tolerating well, can add Jardiance and Spironolactone on Friday    - Strict Is/Os and daily weights   - plan for diagnostic LHC this afternoon - NPO for now      Mitral Regurgitation    - noted to be moderate to severe on TTE   - valvulopathy concerning for cause of HFrEF   - Plan for ROXANNE on Friday for further evaluation  - keep NPO after midnight on Thursday night      HTN   - BP not at goal   - antihypertensive recommendations as above    - Continue Low Na Diet and Exercise as tolerated     Thank you for allowing us to participate in the care of this patient. We will continue to follow along for now.       Molly Serrano DO  [facility] Cardiology Fellow, PGY-6  09/06/2023 1:52 PM             Cardiology Attending  I evaluated Todd Bernal and discussed the patient's symptoms, findings, and management with the cardiology fellow on the Cardiology Service.  The patient is a 48 y.o. male with:   Chief Complaint   Patient presents with    Shortness of  "Breath     Pt arrives with c/o SOB that started about a week ago; pt denies any medical hx O2 sat 91% on room air         There is no problem list on file for this patient.    History reviewed. No pertinent surgical history.  Review of patient's allergies indicates:   Allergen Reactions    Phenergan [promethazine]          Current Facility-Administered Medications:     acetaminophen tablet 650 mg, 650 mg, Oral, Q4H PRN, Therese Santiago MD, 650 mg at 09/05/23 0431    diazePAM tablet 5 mg, 5 mg, Oral, Once, Molly Serrano DO    enoxaparin injection 40 mg, 40 mg, Subcutaneous, Daily, Therese Santiago MD, 40 mg at 09/05/23 1757    [START ON 9/7/2023] furosemide tablet 20 mg, 20 mg, Oral, Daily, Tello Melendez DO    labetalol 20 mg/4 mL (5 mg/mL) IV syring, 10 mg, Intravenous, Q4H PRN, Lg Hernandez MD    levoFLOXacin tablet 750 mg, 750 mg, Oral, Daily, Tello Melendez DO, 750 mg at 09/06/23 0854    metoprolol succinate (TOPROL-XL) 24 hr tablet 25 mg, 25 mg, Oral, Daily, Molly Serrano DO, 25 mg at 09/06/23 0854    sacubitriL-valsartan 24-26 mg per tablet 1 tablet, 1 tablet, Oral, BID, Bruno Johnson, DO, 1 tablet at 09/06/23 0854    sodium chloride 0.9% flush 10 mL, 10 mL, Intravenous, PRN, Therese Santiago MD    sodium chloride 0.9% flush 10 mL, 10 mL, Intravenous, PRN, Tello Melendez DO    sodium chloride 0.9% flush 10 mL, 10 mL, Intravenous, PRN, Molly Serrano DO    spironolactone tablet 25 mg, 25 mg, Oral, Daily, Tello Melendez DO    venlafaxine 24 hr capsule 75 mg, 75 mg, Oral, Daily, Jay Johnsonle, DO    Blood pressure 126/88, pulse 91, temperature 97.9 °F (36.6 °C), temperature source Oral, resp. rate 18, height 5' 5" (1.651 m), weight 53.2 kg (117 lb 3.2 oz), SpO2 (!) 92 %.  Labs  BMP  Lab Results   Component Value Date     09/06/2023    K 3.6 09/06/2023    CO2 26 09/06/2023    BUN 18.3 09/06/2023    CREATININE 0.70 (L) 09/06/2023    CALCIUM 9.0 09/06/2023    EGFRNORACEVR >60 " "09/06/2023     Lab Results   Component Value Date    CREATININE 0.70 (L) 09/06/2023    CREATININE 0.78 09/05/2023    CREATININE 0.76 09/04/2023     BNP:    Lab Results   Component Value Date    .5 (H) 09/03/2023      Lab Results   Component Value Date    TROPONINI <0.010 09/04/2023    TROPONINI <0.010 09/03/2023     Lab Results   Component Value Date    WBC 8.77 09/06/2023    HGB 14.5 09/06/2023    HCT 43.3 09/06/2023    MCV 90.4 09/06/2023     09/06/2023         Lab Results   Component Value Date    HGB 14.5 09/06/2023    HGB 14.8 09/05/2023    HGB 13.8 (L) 09/04/2023     Lab Results   Component Value Date    CHOL 170 09/04/2023     Lab Results   Component Value Date    HDL 66 (H) 09/04/2023     No results found for: "LDLCALC"  No results found for: "DLDL"  Lab Results   Component Value Date    TRIG 80 09/04/2023       f1 No results found for: "CHOLHDL"    No components found for: "EKG 12-LEAD" No components found for: "[id number]"  Echo  Results for orders placed during the hospital encounter of 09/03/23    Echo    Interpretation Summary    Left Ventricle: The left ventricle is moderately dilated. There is severely reduced systolic function with a visually estimated ejection fraction of 15 - 20%.    Left Atrium: Left atrium is dilated. LA volume index is 62ml/m2.    Right Ventricle: Normal right ventricular cavity size. Systolic function is normal.    Right Atrium: Right atrium is mildly dilated.    Aortic Valve: The aortic valve is a trileaflet valve.    Mitral Valve: There is moderate to severe regurgitation.    Tricuspid Valve: There is mild to moderate regurgitation.    Pulmonary Artery: The estimated pulmonary artery systolic pressure is 50 mmHg.    IVC/SVC: Normal venous pressure at 3 mmHg.    Pericardium: There is a small effusion. Bilateral pleural effusions.    Stress test  No results found for this or any previous visit.    Coronary Angiogram  No results found for this or any previous " visit.      Assessment  Cardiomyopathy   Moderate to severe MR    RECOMMENDATIONS:  Medications:  GDMT  Labs:  monitor electrolytes  Work up:  plan on outpatient LHC and ROXANNE

## 2023-09-06 NOTE — BRIEF OP NOTE
Angiogram, Coronary, with Left Heart Cath Brief Op Note  Patient Name: Todd Bernal  MRN: [id number]  : 1975  Date of Procedure: 2023  Location of Procedure: [facility] CATH LAB    Procedure: Angiogram, Coronary, with Left Heart Cath    Pre-op Dx: HFrEF    Post-op Dx: nonischemic HFrEF, Normal Coronaries      Staff: Surgeon(s):  Lexa Fonseca MD Rebecca Prechter, DO    Anesthesia: RN IV Sedation    Indication for Procedure: HFrEF    Access: Rigth Radial     Findings:   -Dominance: Co-dominant   -Normal Coronaries. No CAD  -LVEDP: 20       Hemostasis: achieved     Estimated Blood Loss: <10cc           Complications: None           Disposition: PACU - hemodynamically stable.           Condition: stable    Impression/Plan:  -Continue Post-Cath Care       Molly Serrano DO  [employer] Cardiology Fellow, PGY-6  2023 2:02 PM

## 2023-09-06 NOTE — PROCEDURE NOTE ADDENDUM
[facility]  Thoracentesis  Procedure Note    SUMMARY     Date of Procedure:  9/6/2023    Procedure: Thoracentesis     Performed by: Tello Melendez DO    Pre-Operative Diagnosis: Pleural Effusion    Post-Operative Diagnosis: same    Anesthesia: Local, 1% lidocaine      Description of the Findings of the Procedure:     Consent: Informed consent was obtained. Risks of the procedure were discussed including: infection, bleeding, pain, pneumothorax.    Under sterile conditions the patient was positioned. Chlorhexadine solution and sterile drapes were utilized.  1% plain lidocaine was used to anesthetize between the rib space after localized under ultrasound. Fluid was obtained after catheter inserted without  difficulty and suction applied with minimal blood loss.  A dressing was applied to the wound and wound care instructions were provided.     350 ml of  azalea  pleural fluid was obtained. A sample was sent to Pathology for cytogenetics, flow, and cell counts, as well as for infection analysis.      Complications: None; patient tolerated the procedure well.    Estimated Blood Loss (EBL): 1ml           Condition: stable    Disposition:  On medical Wards    Attestation:  Procedure was done under the supervision of Dr. Montalvo.       Plan:    A follow up chest x-ray was ordered.  Thoracentesis labs      Tello Melendez DO  Internal Medicine - PGY-3

## 2023-09-06 NOTE — PROGRESS NOTES
[facility] Hospital Medicine  Progress Note      Patient Name: Todd Bernal  : 1975  MRN: [id number]  Patient Class: IP- Inpatient   Admission Date: 9/3/2023   Length of Stay: 2  Admitting Service: Hospital Medicine  Attending Physician: Lv Montiel MD  Resident: Al  Intern: Mary   PCP: Julia, Primary Doctor    CHIEF COMPLAINT     Chief Complaint   Patient presents with    Shortness of Breath     Pt arrives with c/o SOB that started about a week ago; pt denies any medical hx O2 sat 91% on room air         HOSPITAL COURSE     Brief HPI:  Todd Bernal is a 48 y.o.male with no significant past medical history presented to the ED on 9/3/2023  with a primary complaint of Shortness of Breath (Pt arrives with c/o SOB that started about a week ago; pt denies any medical hx O2 sat 91% on room air)  . 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 He reports that for 2 days after his exposure he had SOB, and then his symptoms resolved. Approximately 3 days ago he returned to working in garage, and then had a similar episode of SOB that has gotten progressively worse. 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 He endorses a dry cough. He denies any fever, chills, nausea, or vomiting. He reports no recent sick contacts. He reports that his brother was working in the same conditions and did not have any symptoms. He denies ever having any similar episodes of SOB in the past, and denies any history of asthma or allergic reactions.   In the ED the patient was afebrile, tachycardic to 133, tachypnic to 30. BP was elevated to 160/123. O2 was lower 90s on RA. Electrolytes were wnl, Cr was 0.68.  CBC was non remarkable. Troponin was negative, BNP was 835.5. D-dimer was 0.80. CTA chest was  negative for PE, but showed some mild cardiomegaly, and patchy ground-glass opacities tending towards consolidation at the lung bases suggestive of multifocal possibly atypical infectious pneumonia. In the ED he was placed on CPAP, nitro drip, and given 40 mg Lasix. The patient was admitted for workup of new onset CHF vs pneumonia.     Interval History:  No acute events documented overnight, improving SBP ranging 130s-140s, otherwise VSS and AF. Patient is asymptomatic this AM and does not appear fluid overloaded at this time. Diuresing well, urine output of 1.2L overnight, AM output was not documented yesterday. Cardiology consulted yesterday for Echo results consistent with HFrEF w EF 15-20% and recommendations given as stated below.  BC NG at 24 hrs. NPO for LHC today at 1430.     OBJECTIVE/PHYSICAL EXAM     VITAL SIGNS (MOST RECENT):  Temp: 97.6 °F (36.4 °C) (09/06/23 0333)  Pulse: 90 (09/06/23 0333)  Resp: 18 (09/05/23 1928)  BP: (!) 136/96 (09/06/23 0333)  SpO2: (!) 94 % (09/06/23 0333) VITAL SIGNS (24 HOUR RANGE):  Temp:  [97.6 °F (36.4 °C)-97.8 °F (36.6 °C)]   Pulse:  []   Resp:  [18]   BP: (133-136)/(96-99)   SpO2:  [93 %-96 %]      Physical Exam  Vitals and nursing note reviewed.   Constitutional:       General: He is not in acute distress.     Appearance: He is not ill-appearing or diaphoretic.   HENT:      Nose: No congestion or rhinorrhea.   Neck:      Vascular: No hepatojugular reflux or JVD.   Cardiovascular:      Rate and Rhythm: Regular rhythm. Tachycardia present.   Pulmonary:      Effort: Pulmonary effort is normal. No respiratory distress.      Breath sounds: Normal breath sounds. No wheezing, rhonchi or rales.      Comments: 3L NC, SpO2>94%  No pulmonary crackles   Musculoskeletal:      Right lower leg: No edema.      Left lower leg: No edema.   Skin:     Capillary Refill: Capillary refill takes less than 2 seconds.   Neurological:      Mental Status: He is alert and oriented to person,  "place, and time.         LABS/MICRO/MEDS/DIAGNOSTICS     LABS  CBC  Recent Labs     09/05/23 0328 09/06/23  0350   WBC 11.09 8.77   RBC 4.91 4.79   HGB 14.8 14.5   HCT 43.5 43.3   MCV 88.6 90.4   MCH 30.1 30.3   MCHC 34.0 33.5   RDW 12.7 12.9    331       Anemia  No results for input(s): "HAPTOGLOBIN", "FERRITIN", "IRON", "TIBC" in the last 72 hours.  Coags  Recent Labs     09/03/23 2243   INR 1.0   D-DIMER 0.80*       Cardiac  Recent Labs     09/03/23 2243 09/04/23  1152   .5*  --      --    TROPONINI <0.010 <0.010       ABG/Lactate  Recent Labs     09/04/23  0605   PH 7.490*   PCO2 37.0   PO2 58.0*   HCO3 28.2*         BMP  Recent Labs     09/05/23 0328 09/06/23  0350    141   K 3.8 3.6   CHLORIDE 105 105   CO2 25 26   BUN 14.8 18.3   CREATININE 0.78 0.70*   GLUCOSE 108* 101*   CALCIUM 8.8 9.0   MG 2.10 2.10   PHOS 3.5 4.0       LFTs  Recent Labs     09/05/23 0328 09/06/23  0350   ALBUMIN 3.1* 3.0*   GLOBULIN 3.6* 3.6*   ALKPHOS 88 82   ALT 15 13   AST 15 15   BILITOT 0.6 0.5       Inflammatory Markers  No results for input(s): "CRP", "LDH", "ESR" in the last 72 hours.  Lipid  Recent Labs     09/04/23 0424   CHOL 170   TRIG 80   LDL 88.00   VLDL 16   HDL 66*       Diabetes  Recent Labs     09/04/23 0424 09/05/23 0328 09/06/23  0350   HGBA1C 5.4  --   --    GLUCOSE 142* 108* 101*       Thyroid  Recent Labs     09/03/23 2243   TSH 2.131            MICROBIOLOGY  Microbiology Results (last 7 days)       Procedure Component Value Units Date/Time    Blood Culture #1 **CANNOT BE ORDERED STAT** [[id number]]  (Normal) Collected: 09/04/23 0131    Order Status: Completed Specimen: Blood from Arm, Left Updated: 09/05/23 1000     CULTURE, BLOOD ([facility]) No Growth At 24 Hours    Blood Culture #2 **CANNOT BE ORDERED STAT** [[id number]]  (Normal) Collected: 09/04/23 0143    Order Status: Completed Specimen: Blood from Hand, Right Updated: 09/05/23 1000     CULTURE, BLOOD ([facility]) No Growth At 24 Hours "    Respiratory Culture [[id number]]     Order Status: Sent Specimen: Sputum                MEDICATIONS   azithromycin  500 mg Oral Daily    enoxparin  40 mg Subcutaneous Daily    furosemide  40 mg Oral Daily    metoprolol succinate  25 mg Oral Daily    sacubitriL-valsartan  1 tablet Oral BID    venlafaxine  75 mg Oral Daily         INFUSIONS         DIAGNOSTIC TESTS  X-Ray Chest 1 View   Final Result      No significant change as compared with the previous exam         Electronically signed by: Rogelio Castle   Date:    09/05/2023   Time:    11:47      CTA Chest Non-Coronary (PE Studies)   Final Result   Technique: CT Scan of the chest was performed with intravenous contrast with direct axial images as well as sagittal and coronal reconstruction images pulmonary embolus protocol.  Dosage Information: Automated Exposure Control was utilized 178.05 mGy.cm.  Comparison: None.  Clinical History: Pulmonary embolism (PE) suspected, positive D-dimer.  Findings:Artifact: Streak artifact is seen in the right anterior chest wall partially obscuring the adjacent anatomy.  Soft Tissues: The visualized soft tissues of the chest wall appear unremarkable.  Axilla: A few mildly prominent lymph nodes are seen in the left and right axilla.  Neck: The visualized soft tissues of the neck appear unremarkable.  Mediastinum: The mediastinal structures are within normal limits.  Heart: Mild cardiomegaly is seen.  Aorta: Unremarkable appearing aorta. No aortic dissection or aneurysm is seen.  Pulmonary Arteries: No filling defects are seen in the pulmonary arteries to suggest pulmonary embolus.  Lungs: There   are areas of patchy ground-glass opacity with some central predominance with some additional areas of dense patchy opacity tending towards consolidation at the lung bases. The patient has bilateral moderate to large pleural effusions.  Bony Structures:Spine: Mild multilevel spondylotic changes are seen in the thoracic spine. Mild  anterior compression deformity of L1 is demonstrated.  Ribs: The visualized ribs appear unremarkable.  Abdomen: Incidental note is made of a left renal cyst arising from the superior pole of the visualized left kidney. The rest of the visualized upper abdominal organs appear unremarkable.  Impression:1. Mild cardiomegaly is seen. 2. No filling defects are seen in the pulmonary arteries to suggest pulmonary embolus. 3. There are areas of patchy ground-glass opacity with some central predominance with some additional areas of dense patchy opacity tending towards consolidation at the lung bases. The patient has bilateral moderate to large pleural effusions. These   findings are suggestive of multifocal possibly atypical infectious pneumonia with probable concurrent areas of dense possibly compressive atelectasis at the lung bases. Correlate with clinical and laboratory findings as regards additional evaluation and follow-up to full resolution. 4. Details and other findings as discussed above.  I concur with the preliminary report above.         Electronically signed by: Shaun Salazar   Date:    09/04/2023   Time:    08:49      X-Ray Chest 1 View   ED Interpretation   Bilateral pulmonary vascular congestion with bilateral lower lobe consolidations / pulmonary edema      Final Result      Findings suggestive of fluid overload/heart failure         Electronically signed by: Kenneth Alcantara MD   Date:    09/04/2023   Time:    08:57      X-Ray Chest 1 View    (Results Pending)        Echo    Result Date: 9/5/2023    Left Ventricle: The left ventricle is moderately dilated. There is   severely reduced systolic function with a visually estimated ejection   fraction of 15 - 20%.    Left Atrium: Left atrium is dilated. LA volume index is 62ml/m2.    Right Ventricle: Normal right ventricular cavity size. Systolic   function is normal.    Right Atrium: Right atrium is mildly dilated.    Aortic Valve: The aortic valve is a  trileaflet valve.    Mitral Valve: There is moderate to severe regurgitation.    Tricuspid Valve: There is mild to moderate regurgitation.    Pulmonary Artery: The estimated pulmonary artery systolic pressure is   50 mmHg.    IVC/SVC: Normal venous pressure at 3 mmHg.    Pericardium: There is a small effusion. Bilateral pleural effusions.             ASSESSMENT/PLAN   Shortness of Breath   B/L Pleural Effusions on CXR  New onset HFrEF w EF 15-20% on Echo 9/5/23   -Elevated , troponin negative, EKG unremarkable in ED   -Echo yesterday significant for reduced EF and moderate to severe multi-valvular dysfunction    -Cardiology consulted, appreciate recommendations  -Will add Spironolactone and Jardiance if tolerated   -C scheduled for today at 1430 given infectious process is ruled out, currently NPO    -Strict I&Os and daily weights, diuresed well over the last 24 hours   -Sodium and fluid restriction in place  -Repeat CXR show improvement of bilateral effusions, continue to diurese daily     PNA vs COPD   SIRS criteria, VS and possible PNA  -Consider starting Duonebs, inhalation treatment and IS   -Hx of 1PPD x 20 years, quit 4 years ago  -Initial ABG consistent with alkalosis, does not appear to be consistent with COPD   -BC x 2 NG at 24 hrs, RC and MRSA PCR not collected yet   -Continue Vanc + Zosyn + Azithromycin     Elevated BP   -Lasix 40mg PO qd and Entresto 24-26 BID initiated   -Toprol 25mg qd initiated   -SBP goal 90-110s, HR goal <70 per cardiology       Access: Peripheral  Antibiotics: Vanc + Zosyn +Azithro (started 9/4)  Diet: NPO  DVT Prophylaxis: Lovenox   GI Prophylaxis: None   Fluids: None     Dispo: 47 yo M admitted to telemetry for SOB workup, new onset HFrEF however PNA and COPD may also be contributing. Cardiology placed recommendations, pending [facility] today at 1430 given infectious etiology ruled out. Continue to monitor.       Lg Hernandez MD  [facility]

## 2023-09-06 NOTE — INTERVAL H&P NOTE
The patient has been examined and the H&P has been reviewed:    I concur with the findings and no changes have occurred since H&P was written.    Procedure risks, benefits and alternative options discussed and understood by patient/family.          Active Hospital Problems    Diagnosis  POA    Nonrheumatic mitral valve regurgitation [I34.0]  Yes     Chronic    HFrEF (heart failure with reduced ejection fraction) [I50.20]  Yes     Chronic    Acute on chronic HFrEF (heart failure with reduced ejection fraction) [I50.23]  Yes      Resolved Hospital Problems   No resolved problems to display.

## 2023-09-07 LAB
ALBUMIN SERPL-MCNC: 2.9 G/DL (ref 3.5–5)
ALBUMIN/GLOB SERPL: 0.8 RATIO (ref 1.1–2)
ALP SERPL-CCNC: 73 UNIT/L (ref 40–150)
ALT SERPL-CCNC: 16 UNIT/L (ref 0–55)
AST SERPL-CCNC: 16 UNIT/L (ref 5–34)
BASOPHILS # BLD AUTO: 0.07 X10(3)/MCL
BASOPHILS NFR BLD AUTO: 0.9 %
BILIRUB SERPL-MCNC: 0.4 MG/DL
BUN SERPL-MCNC: 20.9 MG/DL (ref 8.9–20.6)
CALCIUM SERPL-MCNC: 9.1 MG/DL (ref 8.4–10.2)
CHLORIDE SERPL-SCNC: 108 MMOL/L (ref 98–107)
CO2 SERPL-SCNC: 26 MMOL/L (ref 22–29)
CREAT SERPL-MCNC: 0.82 MG/DL (ref 0.73–1.18)
EOSINOPHIL # BLD AUTO: 0.31 X10(3)/MCL (ref 0–0.9)
EOSINOPHIL NFR BLD AUTO: 4.1 %
ERYTHROCYTE [DISTWIDTH] IN BLOOD BY AUTOMATED COUNT: 13.1 % (ref 11.5–17)
ESTROGEN SERPL-MCNC: NORMAL PG/ML
GFR SERPLBLD CREATININE-BSD FMLA CKD-EPI: >60 MLS/MIN/1.73/M2
GLOBULIN SER-MCNC: 3.5 GM/DL (ref 2.4–3.5)
GLUCOSE SERPL-MCNC: 98 MG/DL (ref 74–100)
GRAM STN SPEC: NORMAL
GRAM STN SPEC: NORMAL
HCT VFR BLD AUTO: 44.1 % (ref 42–52)
HGB BLD-MCNC: 14.6 G/DL (ref 14–18)
IMM GRANULOCYTES # BLD AUTO: 0.02 X10(3)/MCL (ref 0–0.04)
IMM GRANULOCYTES NFR BLD AUTO: 0.3 %
INSULIN SERPL-ACNC: NORMAL U[IU]/ML
LAB AP CLINICAL INFORMATION: NORMAL
LAB AP GROSS DESCRIPTION: NORMAL
LAB AP NON-GYN INTERPRETATION SPECIMEN 1: NORMAL
LYMPHOCYTES # BLD AUTO: 2.46 X10(3)/MCL (ref 0.6–4.6)
LYMPHOCYTES NFR BLD AUTO: 32.8 %
MAGNESIUM SERPL-MCNC: 2.1 MG/DL (ref 1.6–2.6)
MCH RBC QN AUTO: 30.5 PG (ref 27–31)
MCHC RBC AUTO-ENTMCNC: 33.1 G/DL (ref 33–36)
MCV RBC AUTO: 92.1 FL (ref 80–94)
MONOCYTES # BLD AUTO: 0.95 X10(3)/MCL (ref 0.1–1.3)
MONOCYTES NFR BLD AUTO: 12.7 %
NEUTROPHILS # BLD AUTO: 3.68 X10(3)/MCL (ref 2.1–9.2)
NEUTROPHILS NFR BLD AUTO: 49.2 %
NRBC BLD AUTO-RTO: 0 %
PHOSPHATE SERPL-MCNC: 3.1 MG/DL (ref 2.3–4.7)
PLATELET # BLD AUTO: 334 X10(3)/MCL (ref 130–400)
PMV BLD AUTO: 9.2 FL (ref 7.4–10.4)
POTASSIUM SERPL-SCNC: 4.4 MMOL/L (ref 3.5–5.1)
PROT SERPL-MCNC: 6.4 GM/DL (ref 6.4–8.3)
RBC # BLD AUTO: 4.79 X10(6)/MCL (ref 4.7–6.1)
RHODAMINE-AURAMINE STN SPEC: NORMAL
SODIUM SERPL-SCNC: 141 MMOL/L (ref 136–145)
WBC # SPEC AUTO: 7.49 X10(3)/MCL (ref 4.5–11.5)

## 2023-09-07 PROCEDURE — 85025 COMPLETE CBC W/AUTO DIFF WBC: CPT

## 2023-09-07 PROCEDURE — 21400001 HC TELEMETRY ROOM

## 2023-09-07 PROCEDURE — 25000003 PHARM REV CODE 250

## 2023-09-07 PROCEDURE — 25000003 PHARM REV CODE 250: Performed by: INTERNAL MEDICINE

## 2023-09-07 PROCEDURE — 94761 N-INVAS EAR/PLS OXIMETRY MLT: CPT

## 2023-09-07 PROCEDURE — 84100 ASSAY OF PHOSPHORUS: CPT

## 2023-09-07 PROCEDURE — 99900035 HC TECH TIME PER 15 MIN (STAT)

## 2023-09-07 PROCEDURE — 80053 COMPREHEN METABOLIC PANEL: CPT

## 2023-09-07 PROCEDURE — 63600175 PHARM REV CODE 636 W HCPCS: Performed by: STUDENT IN AN ORGANIZED HEALTH CARE EDUCATION/TRAINING PROGRAM

## 2023-09-07 PROCEDURE — 83735 ASSAY OF MAGNESIUM: CPT

## 2023-09-07 RX ORDER — METOPROLOL SUCCINATE 50 MG/1
100 TABLET, EXTENDED RELEASE ORAL DAILY
Status: DISCONTINUED | OUTPATIENT
Start: 2023-09-08 | End: 2023-09-08 | Stop reason: HOSPADM

## 2023-09-07 RX ADMIN — ENOXAPARIN SODIUM 40 MG: 40 INJECTION SUBCUTANEOUS at 05:09

## 2023-09-07 RX ADMIN — SACUBITRIL AND VALSARTAN 1 TABLET: 24; 26 TABLET, FILM COATED ORAL at 08:09

## 2023-09-07 RX ADMIN — SACUBITRIL AND VALSARTAN 1 TABLET: 24; 26 TABLET, FILM COATED ORAL at 09:09

## 2023-09-07 RX ADMIN — FUROSEMIDE 20 MG: 20 TABLET ORAL at 08:09

## 2023-09-07 RX ADMIN — LEVOFLOXACIN 750 MG: 750 TABLET, FILM COATED ORAL at 08:09

## 2023-09-07 RX ADMIN — METOPROLOL SUCCINATE 50 MG: 50 TABLET, FILM COATED, EXTENDED RELEASE ORAL at 08:09

## 2023-09-07 RX ADMIN — SPIRONOLACTONE 25 MG: 25 TABLET ORAL at 08:09

## 2023-09-07 NOTE — PROGRESS NOTES
"Cardiology Consult Note     Cardiology Attending: Dr. Fonseca   Cardiology Fellow: Molly Serrano DO     Date of Admit: 9/3/2023  Date of Consult: 9/7/2023    Reason for Consultation:     "New-Onset HFrEF"    History of Present Illness:      Brief HPI: Todd Bernal is a 48 y.o. male with no significant PMH who presented to the ED on 9/4 with complaints of SOB x1 week. 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 SOB started after exposure. On arrival noted to have elevated BNP and Cardiomegaly. Subsequent workup showed severely reduced EF with moderate to severe MR. Patient also endorses family history of premature CAD. Cardiology has been consulted for further evaluation.     Interval History:  Patient was seen and examined this afternoon. Patient is doing well today. He reports still having high heart rate.     Past Medical History:   History reviewed. No pertinent past medical history.  Surgical History:     Past Surgical History:   Procedure Laterality Date    ANGIOGRAM, CORONARY, WITH LEFT HEART CATHETERIZATION N/A 9/6/2023    Procedure: Angiogram, Coronary, with Left Heart Cath;  Surgeon: Lexa Fonseca MD;  Location: [facility] CATH LAB;  Service: Cardiology;  Laterality: N/A;     Allergies:     Review of patient's allergies indicates:   Allergen Reactions    Phenergan [promethazine]      Family History:   History reviewed. No pertinent family history.  Social History:     Social History     Tobacco Use    Smoking status: Never    Smokeless tobacco: Never   Substance Use Topics    Alcohol use: Never    Drug use: Never     Review of Systems:     ROS completed and negative except as above.     Medications:     Home Medications:  Prior to Admission medications    Not on File       Current/Inpatient Medications:  Infusions:   nitroGLYCERIN in 5 % dextrose       Scheduled:   diazePAM  5 mg Oral Once    enoxparin  40 mg Subcutaneous Daily    furosemide  20 mg Oral Daily    levoFLOXacin  750 mg Oral Daily " "   metoprolol succinate  50 mg Oral Daily    sacubitriL-valsartan  1 tablet Oral BID    spironolactone  25 mg Oral Daily    venlafaxine  75 mg Oral Daily     PRN:  acetaminophen, acetaminophen, labetalol, nitroGLYCERIN in 5 % dextrose, ondansetron, sodium chloride 0.9%, sodium chloride 0.9%, sodium chloride 0.9%    Objective:     24-hour Vitals:   Temp:  [97.5 °F (36.4 °C)-98 °F (36.7 °C)] 97.5 °F (36.4 °C)  Pulse:  [] 91  Resp:  [18] 18  SpO2:  [93 %-97 %] 97 %  BP: (100-126)/(69-87) 100/70    Vitals: /70   Pulse 91   Temp 97.5 °F (36.4 °C) (Oral)   Resp 18   Ht 5' 5" (1.651 m)   Wt 54.1 kg (119 lb 4.8 oz)   SpO2 97%   BMI 19.85 kg/m²     Intake/Output Summary (Last 24 hours) at 9/7/2023 1228  Last data filed at 9/7/2023 0313  Gross per 24 hour   Intake 550 ml   Output 600 ml   Net -50 ml         Physical Exam  Constitutional:       Appearance: Normal appearance.   HENT:      Head: Normocephalic and atraumatic.   Eyes:      Conjunctiva/sclera: Conjunctivae normal.   Neck:      Vascular: No carotid bruit.   Cardiovascular:      Rate and Rhythm: Normal rate and regular rhythm.   Pulmonary:      Effort: Pulmonary effort is normal.      Breath sounds: Normal breath sounds.   Abdominal:      General: Bowel sounds are normal.      Palpations: Abdomen is soft.   Musculoskeletal:      Right lower leg: No edema.      Left lower leg: No edema.   Skin:     General: Skin is warm and dry.   Neurological:      Mental Status: He is alert. Mental status is at baseline.   Psychiatric:         Mood and Affect: Mood normal.         Thought Content: Thought content normal.         Judgment: Judgment normal.        Labs:   I have reviewed the following labs below:    Recent Labs   Lab 09/03/23  2243 09/04/23  0424 09/04/23  1152 09/05/23  0328 09/06/23  0350 09/07/23  0618   WBC 10.15   < >  --  11.09 8.77 7.49   HGB 13.8*   < >  --  14.8 14.5 14.6   HCT 42.2   < >  --  43.5 43.3 44.1      < >  --  342 331 334 "      < >  --  139 141 141   K 3.8   < >  --  3.8 3.6 4.4   CO2 25   < >  --  25 26 26   BUN 15.7   < >  --  14.8 18.3 20.9*   CREATININE 0.68*   < >  --  0.78 0.70* 0.82   CALCIUM 8.8   < >  --  8.8 9.0 9.1   MG 1.80   < >  --  2.10 2.10 2.10   PHOS  --    < >  --  3.5 4.0 3.1   ALBUMIN 3.3*   < >  --  3.1* 3.0* 2.9*   BILITOT 0.3   < >  --  0.6 0.5 0.4   AST 21   < >  --  15 15 16   ALT 19   < >  --  15 13 16   ALKPHOS 93   < >  --  88 82 73   PTT 31.0  --   --   --   --   --    INR 1.0  --   --   --   --   --    TROPONINI <0.010  --  <0.010  --   --   --    .5*  --   --   --   --   --     < > = values in this interval not displayed.       Cardiovascular Studies/Imaging:   I have reviewed the following studies below:    TTE (9/5/2023):   Left Ventricle: The left ventricle is moderately dilated. There is severely reduced systolic function with a visually estimated ejection fraction of 15 - 20%.    Left Atrium: Left atrium is dilated. LA volume index is 62ml/m2.    Right Ventricle: Normal right ventricular cavity size. Systolic function is normal.    Right Atrium: Right atrium is mildly dilated.    Aortic Valve: The aortic valve is a trileaflet valve.    Mitral Valve: There is moderate to severe regurgitation.    Tricuspid Valve: There is mild to moderate regurgitation.    Pulmonary Artery: The estimated pulmonary artery systolic pressure is 50 mmHg.    IVC/SVC: Normal venous pressure at 3 mmHg.    Pericardium: There is a small effusion. Bilateral pleural effusions.      Imaging:   X-Ray Chest 1 View - 9/5/2023  EXAMINATION: XR CHEST 1 VIEW CPT 35776 CLINICAL HISTORY: pna; COMPARISON: September 3, 2023 FINDINGS: Examination reveals cardiomediastinal silhouette and pleuroparenchymal changes to be essentially unchanged as compared with the previous exam. Bilateral pleural effusions are again identified. Increased left retrocardiac density and silhouetting of the left hemidiaphragm is seen which might be  related to pleural fluid and or represent an infiltrate/atelectasis       11:47    X-Ray Chest 1 View - 9/4/2023  EXAMINATION: XR CHEST 1 VIEW CLINICAL HISTORY: Dyspnea; COMPARISON: None FINDINGS: Single view of the chest shows bilateral effusions with dependent patchy ground-glass opacities and central vascular congestion.  No pneumothorax.  Heart is upper normal in size. Findings suggestive of fluid overload/heart failure       CTA CHEST NON CORONARY (PE STUDIES) - 9/4/2023    Technique: CT Scan of the chest was performed with intravenous contrast with direct axial images as well as sagittal and coronal reconstruction images pulmonary embolus protocol.  Dosage Information: Automated Exposure Control was utilized 178.05 mGy.cm.  Comparison: None.  Clinical History: Pulmonary embolism (PE) suspected, positive D-dimer.  Findings:Artifact: Streak artifact is seen in the right anterior chest wall partially obscuring the adjacent anatomy.  Soft Tissues: The visualized soft tissues of the chest wall appear unremarkable.  Axilla: A few mildly prominent lymph nodes are seen in the left and right axilla.  Neck: The visualized soft tissues of the neck appear unremarkable.  Mediastinum: The mediastinal structures are within normal limits.  Heart: Mild cardiomegaly is seen.  Aorta: Unremarkable appearing aorta. No aortic dissection or aneurysm is seen.  Pulmonary Arteries: No filling defects are seen in the pulmonary arteries to suggest pulmonary embolus.  Lungs: There are areas of patchy ground-glass opacity with some central predominance with some additional areas of dense patchy opacity tending towards consolidation at the lung bases. The patient has bilateral moderate to large pleural effusions.  Bony Structures:Spine: Mild multilevel spondylotic changes are seen in the thoracic spine. Mild anterior compression deformity of L1 is demonstrated.  Ribs: The visualized ribs appear unremarkable.  Abdomen: Incidental note is  made of a left renal cyst arising from the superior pole of the visualized left kidney. The rest of the visualized upper abdominal organs appear unremarkable.  Impression:1. Mild cardiomegaly is seen. 2. No filling defects are seen in the pulmonary arteries to suggest pulmonary embolus. 3. There are areas of patchy ground-glass opacity with some central predominance with some additional areas of dense patchy opacity tending towards consolidation at the lung bases. The patient has bilateral moderate to large pleural effusions. These findings are suggestive of multifocal possibly atypical infectious pneumonia with probable concurrent areas of dense possibly compressive atelectasis at the lung bases. Correlate with clinical and laboratory findings as regards additional evaluation and follow-up to full resolution. 4. Details and other findings as discussed above.  I concur with the preliminary report above.    Assessment & Plan:     New-Onset Heart Failure with Reduced Ejection Fraction    - Etiology: undetermined but likely 2/2 MR   - HF Status: NYHA Class II-III, ACC/AHA Stage C   - LVEF: 15-20% (TTE on 9/5/2023)    - Continue Entresto   - Recommend starting Spironolactone 25mg today    - Recommend increasing metoprolol to 100mg (goal HR <70)     - agree with PO diuretics as patient appears euvolemic on exam    - If tolerating well, recommend adding Jardiance prior to discharge    - Strict Is/Os and daily weights   - LHC performed yesterday shows no coronary disease    - Plan for ROXANNE tomorrow afternoon     Mitral Regurgitation    - noted to be moderate to severe on TTE   - valvulopathy concerning for cause of HFrEF  - if ROXANNE confirms severity of MR, will set up to see CT surgery in [facility] for evaluation    - Plan for ROXANNE tomorrow for further evaluation  - keep NPO after midnight    HTN   - BP approaching goal    - antihypertensive recommendations as above    - Continue Low Na Diet and Exercise as tolerated      Tachycardia   - HR goal <70    - recommend increasing Metoprolol to 100mg     Thank you for allowing us to participate in the care of this patient. We will continue to follow along for now.       oMlly Serrano DO  [facility] Cardiology Fellow, PGY-6  09/07/2023 1:52 PM             Cardiology Attending  I evaluated Todd Bernal and discussed the patient's symptoms, findings, and management with the cardiology fellow on the Cardiology Service.  The patient is a 48 y.o. male with:   Chief Complaint   Patient presents with    Shortness of Breath     Pt arrives with c/o SOB that started about a week ago; pt denies any medical hx O2 sat 91% on room air         Patient Active Problem List   Diagnosis    Nonrheumatic mitral valve regurgitation    HFrEF (heart failure with reduced ejection fraction)    Acute on chronic HFrEF (heart failure with reduced ejection fraction)       Past Surgical History:   Procedure Laterality Date    ANGIOGRAM, CORONARY, WITH LEFT HEART CATHETERIZATION N/A 9/6/2023    Procedure: Angiogram, Coronary, with Left Heart Cath;  Surgeon: Lexa Fonseca MD;  Location: [facility] CATH LAB;  Service: Cardiology;  Laterality: N/A;     Review of patient's allergies indicates:   Allergen Reactions    Phenergan [promethazine]          Current Facility-Administered Medications:     acetaminophen tablet 650 mg, 650 mg, Oral, Q4H PRN, Therese Santiago MD, 650 mg at 09/05/23 0431    acetaminophen tablet 650 mg, 650 mg, Oral, Q4H PRN, Molly Serrano DO    diazePAM tablet 5 mg, 5 mg, Oral, Once, Molly Serrano DO    enoxaparin injection 40 mg, 40 mg, Subcutaneous, Daily, Therese Santiago MD, 40 mg at 09/06/23 1719    furosemide tablet 20 mg, 20 mg, Oral, Daily, Tello Melendez DO, 20 mg at 09/07/23 0833    labetalol 20 mg/4 mL (5 mg/mL) IV syring, 10 mg, Intravenous, Q4H PRN, Lg Hernandez MD    levoFLOXacin tablet 750 mg, 750 mg, Oral, Daily, Tello Melendez DO, 750 mg at 09/07/23 0833     "metoprolol succinate (TOPROL-XL) 24 hr tablet 50 mg, 50 mg, Oral, Daily, Tello Melendez DO, 50 mg at 09/07/23 0833    nitroGLYCERIN in 5 % dextrose 50 mg/250 mL (200 mcg/mL) infusion, , , Continuous PRN, Lexa Fonseca MD, 100 mcg at 09/06/23 1344    ondansetron disintegrating tablet 8 mg, 8 mg, Oral, Q8H PRN, Molly Serrano DO    sacubitriL-valsartan 24-26 mg per tablet 1 tablet, 1 tablet, Oral, BID, Bruno Johnson DO, 1 tablet at 09/07/23 0833    sodium chloride 0.9% flush 10 mL, 10 mL, Intravenous, PRN, Therese Santiago MD    sodium chloride 0.9% flush 10 mL, 10 mL, Intravenous, PRN, Tello Melendez DO    sodium chloride 0.9% flush 10 mL, 10 mL, Intravenous, PRN, Molly Serrano DO    spironolactone tablet 25 mg, 25 mg, Oral, Daily, Tello Melendez DO, 25 mg at 09/07/23 0833    venlafaxine 24 hr capsule 75 mg, 75 mg, Oral, Daily, Bruno Johnson DO    Blood pressure 100/70, pulse 91, temperature 97.5 °F (36.4 °C), temperature source Oral, resp. rate 18, height 5' 5" (1.651 m), weight 54.1 kg (119 lb 4.8 oz), SpO2 97 %.  Labs  BMP  Lab Results   Component Value Date     09/07/2023    K 4.4 09/07/2023    CO2 26 09/07/2023    BUN 20.9 (H) 09/07/2023    CREATININE 0.82 09/07/2023    CALCIUM 9.1 09/07/2023    EGFRNORACEVR >60 09/07/2023     Lab Results   Component Value Date    CREATININE 0.82 09/07/2023    CREATININE 0.70 (L) 09/06/2023    CREATININE 0.78 09/05/2023     BNP:    Lab Results   Component Value Date    .5 (H) 09/03/2023      Lab Results   Component Value Date    TROPONINI <0.010 09/04/2023    TROPONINI <0.010 09/03/2023     Lab Results   Component Value Date    WBC 7.49 09/07/2023    HGB 14.6 09/07/2023    HCT 44.1 09/07/2023    MCV 92.1 09/07/2023     09/07/2023         Lab Results   Component Value Date    HGB 14.6 09/07/2023    HGB 14.5 09/06/2023    HGB 14.8 09/05/2023     Lab Results   Component Value Date    CHOL 170 09/04/2023     Lab Results   Component Value Date    " "HDL 66 (H) 09/04/2023     No results found for: "LDLCALC"  No results found for: "DLDL"  Lab Results   Component Value Date    TRIG 80 09/04/2023       f1 No results found for: "CHOLHDL"    No components found for: "EKG 12-LEAD" No components found for: "[id number]"  Echo  Results for orders placed during the hospital encounter of 09/03/23    Echo    Interpretation Summary    Left Ventricle: The left ventricle is moderately dilated. There is severely reduced systolic function with a visually estimated ejection fraction of 15 - 20%.    Left Atrium: Left atrium is dilated. LA volume index is 62ml/m2.    Right Ventricle: Normal right ventricular cavity size. Systolic function is normal.    Right Atrium: Right atrium is mildly dilated.    Aortic Valve: The aortic valve is a trileaflet valve.    Mitral Valve: There is moderate to severe regurgitation.    Tricuspid Valve: There is mild to moderate regurgitation.    Pulmonary Artery: The estimated pulmonary artery systolic pressure is 50 mmHg.    IVC/SVC: Normal venous pressure at 3 mmHg.    Pericardium: There is a small effusion. Bilateral pleural effusions.    Stress test  No results found for this or any previous visit.    Coronary Angiogram  No results found for this or any previous visit.      Assessment  Cardiomyopathy   Moderate to severe MR    RECOMMENDATIONS:  Medications:  GDMT  Labs:  monitor electrolytes  Work up:  plan on outpatient LHC and ROXANNE      "

## 2023-09-07 NOTE — PROGRESS NOTES
[facility]  Progress Note      Patient Name: Todd Bernal  : 1975  MRN: [id number]  Patient Class: IP- Inpatient   Admission Date: 9/3/2023   Length of Stay: 3  Admitting Service: Hospital Medicine  Attending Physician: Lv Montiel MD  Resident: Al  Intern: Mary   PCP: Julia, Primary Doctor    CHIEF COMPLAINT     Chief Complaint   Patient presents with    Shortness of Breath     Pt arrives with c/o SOB that started about a week ago; pt denies any medical hx O2 sat 91% on room air         HOSPITAL COURSE     Brief HPI:  Todd Bernal is a 48 y.o.male with no significant past medical history presented to the ED on 9/3/2023  with a primary complaint of Shortness of Breath (Pt arrives with c/o SOB that started about a week ago; pt denies any medical hx O2 sat 91% on room air)  . 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 He reports that for 2 days after his exposure he had SOB, and then his symptoms resolved. Approximately 3 days ago he returned to working in garage, and then had a similar episode of SOB that has gotten progressively worse. 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 He endorses a dry cough. He denies any fever, chills, nausea, or vomiting. He reports no recent sick contacts. He reports that his brother was working in the same conditions and did not have any symptoms. He denies ever having any similar episodes of SOB in the past, and denies any history of asthma or allergic reactions.   In the ED the patient was afebrile, tachycardic to 133, tachypnic to 30. BP was elevated to 160/123. O2 was lower 90s on RA. Electrolytes were wnl, Cr was 0.68.  CBC was non remarkable. Troponin was negative, BNP was 835.5. D-dimer was 0.80. CTA chest was  negative for PE, but showed some mild cardiomegaly, and patchy ground-glass opacities tending towards consolidation at the lung bases suggestive of multifocal possibly atypical infectious pneumonia. In the ED he was placed on CPAP, nitro drip, and given 40 mg Lasix. The patient was admitted for workup of new onset CHF vs pneumonia.     Interval History:  No acute events documented overnight, improving SBP ranging 110-120s, otherwise VSS and AF. Patient is asymptomatic this AM and does not appear fluid overloaded at this time. Urine output of 600cc in the last 12 hours, AM output was not documented yesterday. Patient tolerated yesterday's thoracentesis and LHC well.  BC NG at 48 hrs.     OBJECTIVE/PHYSICAL EXAM     VITAL SIGNS (MOST RECENT):  Temp: 97.7 °F (36.5 °C) (09/07/23 0315)  Pulse: 85 (09/07/23 0315)  Resp: 18 (09/07/23 0315)  BP: 126/87 (09/07/23 0315)  SpO2: 95 % (09/07/23 0315) VITAL SIGNS (24 HOUR RANGE):  Temp:  [97.7 °F (36.5 °C)-98 °F (36.7 °C)]   Pulse:  [83-93]   Resp:  [18]   BP: (112-126)/(69-87)   SpO2:  [93 %-95 %]      Physical Exam  Vitals and nursing note reviewed.   Constitutional:       General: He is not in acute distress.     Appearance: He is not ill-appearing or diaphoretic.   HENT:      Nose: No congestion or rhinorrhea.   Neck:      Vascular: No hepatojugular reflux or JVD.   Cardiovascular:      Rate and Rhythm: Regular rhythm. Tachycardia present.   Pulmonary:      Effort: Pulmonary effort is normal. No respiratory distress.      Breath sounds: Normal breath sounds. No wheezing, rhonchi or rales.      Comments: 3L NC, SpO2>94%  No pulmonary crackles   Musculoskeletal:      Right lower leg: No edema.      Left lower leg: No edema.   Skin:     Capillary Refill: Capillary refill takes less than 2 seconds.   Neurological:      Mental Status: He is alert and oriented to person, place, and time.         LABS/MICRO/MEDS/DIAGNOSTICS     LABS  CBC  Recent Labs     09/05/23  2893  "09/06/23  0350   WBC 11.09 8.77   RBC 4.91 4.79   HGB 14.8 14.5   HCT 43.5 43.3   MCV 88.6 90.4   MCH 30.1 30.3   MCHC 34.0 33.5   RDW 12.7 12.9    331       Anemia  No results for input(s): "HAPTOGLOBIN", "FERRITIN", "IRON", "TIBC" in the last 72 hours.  Coags  No results for input(s): "INR", "APTT", "D-DIMER" in the last 72 hours.    Cardiac  Recent Labs     09/04/23  1152   TROPONINI <0.010       ABG/Lactate  Recent Labs     09/04/23  0605   PH 7.490*   PCO2 37.0   PO2 58.0*   HCO3 28.2*         BMP  Recent Labs     09/05/23  0328 09/06/23  0350    141   K 3.8 3.6   CHLORIDE 105 105   CO2 25 26   BUN 14.8 18.3   CREATININE 0.78 0.70*   GLUCOSE 108* 101*   CALCIUM 8.8 9.0   MG 2.10 2.10   PHOS 3.5 4.0       LFTs  Recent Labs     09/05/23  0328 09/06/23  0350   ALBUMIN 3.1* 3.0*   GLOBULIN 3.6* 3.6*   ALKPHOS 88 82   ALT 15 13   AST 15 15   BILITOT 0.6 0.5       Inflammatory Markers  Recent Labs     09/06/23  0350   *     Lipid  No results for input(s): "CHOL", "TRIG", "LDL", "VLDL", "HDL" in the last 72 hours.    Diabetes  Recent Labs     09/05/23  0328 09/06/23  0350   GLUCOSE 108* 101*       Thyroid  No results for input(s): "TSH", "FREET4" in the last 72 hours.         MICROBIOLOGY  Microbiology Results (last 7 days)       Procedure Component Value Units Date/Time    Gram Stain [[id number]] Collected: 09/06/23 1004    Order Status: Sent Specimen: Pleural Fluid Updated: 09/06/23 1034    Body Fluid Culture [[id number]] Collected: 09/06/23 1004    Order Status: Sent Specimen: Pleural Fluid Updated: 09/06/23 1034    Anaerobic Culture [[id number]] Collected: 09/06/23 1004    Order Status: Sent Specimen: Pleural Fluid Updated: 09/06/23 1034    Fungal Culture [[id number]] Collected: 09/06/23 1004    Order Status: Sent Specimen: Pleural Fluid Updated: 09/06/23 1034    Blood Culture #1 **CANNOT BE ORDERED STAT** [[id number]]  (Normal) Collected: 09/04/23 0131    Order Status: Completed Specimen: Blood " from Arm, Left Updated: 09/06/23 1000     CULTURE, BLOOD ([facility]) No Growth At 48 Hours    Blood Culture #2 **CANNOT BE ORDERED STAT** [[id number]]  (Normal) Collected: 09/04/23 0143    Order Status: Completed Specimen: Blood from Hand, Right Updated: 09/06/23 1000     CULTURE, BLOOD ([facility]) No Growth At 48 Hours    Respiratory Culture [[id number]]     Order Status: Sent Specimen: Sputum                MEDICATIONS   diazePAM  5 mg Oral Once    enoxparin  40 mg Subcutaneous Daily    furosemide  20 mg Oral Daily    levoFLOXacin  750 mg Oral Daily    metoprolol succinate  50 mg Oral Daily    sacubitriL-valsartan  1 tablet Oral BID    spironolactone  25 mg Oral Daily    venlafaxine  75 mg Oral Daily         INFUSIONS   nitroGLYCERIN in 5 % dextrose            DIAGNOSTIC TESTS  X-Ray Chest 1 View   Final Result      Trace bilateral pleural effusions.         Electronically signed by: Lexa Acevedo MD   Date:    09/06/2023   Time:    10:23      X-Ray Chest 1 View   Final Result      As above.         Electronically signed by: Cory Rinaldi   Date:    09/06/2023   Time:    06:51      X-Ray Chest 1 View   Final Result      No significant change as compared with the previous exam         Electronically signed by: Rogelio Castle   Date:    09/05/2023   Time:    11:47      CTA Chest Non-Coronary (PE Studies)   Final Result   Technique: CT Scan of the chest was performed with intravenous contrast with direct axial images as well as sagittal and coronal reconstruction images pulmonary embolus protocol.  Dosage Information: Automated Exposure Control was utilized 178.05 mGy.cm.  Comparison: None.  Clinical History: Pulmonary embolism (PE) suspected, positive D-dimer.  Findings:Artifact: Streak artifact is seen in the right anterior chest wall partially obscuring the adjacent anatomy.  Soft Tissues: The visualized soft tissues of the chest wall appear unremarkable.  Axilla: A few mildly prominent lymph nodes are seen in the left and  right axilla.  Neck: The visualized soft tissues of the neck appear unremarkable.  Mediastinum: The mediastinal structures are within normal limits.  Heart: Mild cardiomegaly is seen.  Aorta: Unremarkable appearing aorta. No aortic dissection or aneurysm is seen.  Pulmonary Arteries: No filling defects are seen in the pulmonary arteries to suggest pulmonary embolus.  Lungs: There   are areas of patchy ground-glass opacity with some central predominance with some additional areas of dense patchy opacity tending towards consolidation at the lung bases. The patient has bilateral moderate to large pleural effusions.  Bony Structures:Spine: Mild multilevel spondylotic changes are seen in the thoracic spine. Mild anterior compression deformity of L1 is demonstrated.  Ribs: The visualized ribs appear unremarkable.  Abdomen: Incidental note is made of a left renal cyst arising from the superior pole of the visualized left kidney. The rest of the visualized upper abdominal organs appear unremarkable.  Impression:1. Mild cardiomegaly is seen. 2. No filling defects are seen in the pulmonary arteries to suggest pulmonary embolus. 3. There are areas of patchy ground-glass opacity with some central predominance with some additional areas of dense patchy opacity tending towards consolidation at the lung bases. The patient has bilateral moderate to large pleural effusions. These   findings are suggestive of multifocal possibly atypical infectious pneumonia with probable concurrent areas of dense possibly compressive atelectasis at the lung bases. Correlate with clinical and laboratory findings as regards additional evaluation and follow-up to full resolution. 4. Details and other findings as discussed above.  I concur with the preliminary report above.         Electronically signed by: Shaun Salazar   Date:    09/04/2023   Time:    08:49      X-Ray Chest 1 View   ED Interpretation   Bilateral pulmonary vascular congestion with  bilateral lower lobe consolidations / pulmonary edema      Final Result      Findings suggestive of fluid overload/heart failure         Electronically signed by: Kenneth Alcantara MD   Date:    09/04/2023   Time:    08:57           Echo    Result Date: 9/5/2023    Left Ventricle: The left ventricle is moderately dilated. There is   severely reduced systolic function with a visually estimated ejection   fraction of 15 - 20%.    Left Atrium: Left atrium is dilated. LA volume index is 62ml/m2.    Right Ventricle: Normal right ventricular cavity size. Systolic   function is normal.    Right Atrium: Right atrium is mildly dilated.    Aortic Valve: The aortic valve is a trileaflet valve.    Mitral Valve: There is moderate to severe regurgitation.    Tricuspid Valve: There is mild to moderate regurgitation.    Pulmonary Artery: The estimated pulmonary artery systolic pressure is   50 mmHg.    IVC/SVC: Normal venous pressure at 3 mmHg.    Pericardium: There is a small effusion. Bilateral pleural effusions.             ASSESSMENT/PLAN   Shortness of Breath   B/L Pleural Effusions on CXR  New onset HFrEF w EF 15-20% on Echo 9/5/23   -Elevated , troponin negative, EKG unremarkable in ED   -Echo yesterday significant for reduced EF and moderate to severe multi-valvular dysfunction    -Cardiology consulted, appreciate recommendations   -Titrate Entresto, Toprol, Aldactone, and Lasix  -Will add Jardiance if tolerated    -Strict I&Os and daily weights, diuresis not documented accurately   -Sodium and fluid restriction in place  -Repeat CXR show improvement of bilateral effusions, tolerated thoracentesis well, fluid studies pending, continue to diurese daily     PNA vs COPD   SIRS criteria, VS and possible PNA  -Consider starting Duonebs, inhalation treatment and IS   -Hx of 1PPD x 20 years, quit 4 years ago  -Initial ABG consistent with alkalosis, does not appear to be consistent with COPD   -BC x 2 NG at 48 hrs, RC and MRSA  PCR pending  -De-escalated abx yesterday, continue PO Levaquin     Elevated BP   -SBP goal 90-110s, HR goal <70 per cardiology       Access: Peripheral  Antibiotics: Levaquin (started 9/6), Vanc + Zosyn +Azithro (discontinued 9/6, started 9/4)  Diet: Heart Healthy, Low Na, fluid restriction 1.5L  DVT Prophylaxis: Lovenox   GI Prophylaxis: None   Fluids: None     Dispo: 47 yo M admitted to telemetry for SOB workup, new onset HFrEF however PNA and COPD may also be contributing. Cardiology consulted, tolerated thoracentesis and LHC well. Possible discharge today or tomorrow.       Lg Hernandez MD  [facility]

## 2023-09-08 ENCOUNTER — ANESTHESIA EVENT (OUTPATIENT)
Dept: CARDIOLOGY | Facility: HOSPITAL | Age: 48
DRG: 286 | End: 2023-09-08
Payer: MEDICAID

## 2023-09-08 ENCOUNTER — ANESTHESIA (OUTPATIENT)
Dept: CARDIOLOGY | Facility: HOSPITAL | Age: 48
DRG: 286 | End: 2023-09-08
Payer: MEDICAID

## 2023-09-08 VITALS
BODY MASS INDEX: 19.83 KG/M2 | OXYGEN SATURATION: 96 % | HEIGHT: 65 IN | DIASTOLIC BLOOD PRESSURE: 68 MMHG | RESPIRATION RATE: 18 BRPM | SYSTOLIC BLOOD PRESSURE: 98 MMHG | HEART RATE: 91 BPM | WEIGHT: 119 LBS | TEMPERATURE: 98 F

## 2023-09-08 LAB
ALBUMIN SERPL-MCNC: 3.1 G/DL (ref 3.5–5)
ALBUMIN/GLOB SERPL: 0.9 RATIO (ref 1.1–2)
ALP SERPL-CCNC: 76 UNIT/L (ref 40–150)
ALT SERPL-CCNC: 17 UNIT/L (ref 0–55)
AST SERPL-CCNC: 18 UNIT/L (ref 5–34)
BASOPHILS # BLD AUTO: 0.07 X10(3)/MCL
BASOPHILS NFR BLD AUTO: 1 %
BILIRUB SERPL-MCNC: 0.3 MG/DL
BSA FOR ECHO PROCEDURE: 1.57 M2
BUN SERPL-MCNC: 21.6 MG/DL (ref 8.9–20.6)
CALCIUM SERPL-MCNC: 9 MG/DL (ref 8.4–10.2)
CHLORIDE SERPL-SCNC: 108 MMOL/L (ref 98–107)
CO2 SERPL-SCNC: 23 MMOL/L (ref 22–29)
CREAT SERPL-MCNC: 0.72 MG/DL (ref 0.73–1.18)
EOSINOPHIL # BLD AUTO: 0.34 X10(3)/MCL (ref 0–0.9)
EOSINOPHIL NFR BLD AUTO: 5 %
ERYTHROCYTE [DISTWIDTH] IN BLOOD BY AUTOMATED COUNT: 13 % (ref 11.5–17)
GFR SERPLBLD CREATININE-BSD FMLA CKD-EPI: >60 MLS/MIN/1.73/M2
GLOBULIN SER-MCNC: 3.4 GM/DL (ref 2.4–3.5)
GLUCOSE SERPL-MCNC: 94 MG/DL (ref 74–100)
HCT VFR BLD AUTO: 43.9 % (ref 42–52)
HGB BLD-MCNC: 14.4 G/DL (ref 14–18)
IMM GRANULOCYTES # BLD AUTO: 0.02 X10(3)/MCL (ref 0–0.04)
IMM GRANULOCYTES NFR BLD AUTO: 0.3 %
LYMPHOCYTES # BLD AUTO: 2.29 X10(3)/MCL (ref 0.6–4.6)
LYMPHOCYTES NFR BLD AUTO: 33.6 %
MAGNESIUM SERPL-MCNC: 2.2 MG/DL (ref 1.6–2.6)
MCH RBC QN AUTO: 30.4 PG (ref 27–31)
MCHC RBC AUTO-ENTMCNC: 32.8 G/DL (ref 33–36)
MCV RBC AUTO: 92.6 FL (ref 80–94)
MONOCYTES # BLD AUTO: 0.7 X10(3)/MCL (ref 0.1–1.3)
MONOCYTES NFR BLD AUTO: 10.3 %
NEUTROPHILS # BLD AUTO: 3.4 X10(3)/MCL (ref 2.1–9.2)
NEUTROPHILS NFR BLD AUTO: 49.8 %
NRBC BLD AUTO-RTO: 0 %
PHOSPHATE SERPL-MCNC: 3.5 MG/DL (ref 2.3–4.7)
PLATELET # BLD AUTO: 348 X10(3)/MCL (ref 130–400)
PMV BLD AUTO: 9.4 FL (ref 7.4–10.4)
POTASSIUM SERPL-SCNC: 4.3 MMOL/L (ref 3.5–5.1)
PROT SERPL-MCNC: 6.5 GM/DL (ref 6.4–8.3)
RBC # BLD AUTO: 4.74 X10(6)/MCL (ref 4.7–6.1)
SODIUM SERPL-SCNC: 142 MMOL/L (ref 136–145)
WBC # SPEC AUTO: 6.82 X10(3)/MCL (ref 4.5–11.5)

## 2023-09-08 PROCEDURE — D9220A PRA ANESTHESIA: Mod: ANES,,, | Performed by: ANESTHESIOLOGY

## 2023-09-08 PROCEDURE — 63600175 PHARM REV CODE 636 W HCPCS: Performed by: NURSE ANESTHETIST, CERTIFIED REGISTERED

## 2023-09-08 PROCEDURE — 80053 COMPREHEN METABOLIC PANEL: CPT

## 2023-09-08 PROCEDURE — D9220A PRA ANESTHESIA: ICD-10-PCS | Mod: ANES,,, | Performed by: ANESTHESIOLOGY

## 2023-09-08 PROCEDURE — 25000003 PHARM REV CODE 250

## 2023-09-08 PROCEDURE — 85025 COMPLETE CBC W/AUTO DIFF WBC: CPT

## 2023-09-08 PROCEDURE — 25000003 PHARM REV CODE 250: Performed by: STUDENT IN AN ORGANIZED HEALTH CARE EDUCATION/TRAINING PROGRAM

## 2023-09-08 PROCEDURE — 25000003 PHARM REV CODE 250: Performed by: INTERNAL MEDICINE

## 2023-09-08 PROCEDURE — D9220A PRA ANESTHESIA: Mod: CRNA,,, | Performed by: NURSE ANESTHETIST, CERTIFIED REGISTERED

## 2023-09-08 PROCEDURE — 84100 ASSAY OF PHOSPHORUS: CPT

## 2023-09-08 PROCEDURE — 94761 N-INVAS EAR/PLS OXIMETRY MLT: CPT

## 2023-09-08 PROCEDURE — 99900035 HC TECH TIME PER 15 MIN (STAT)

## 2023-09-08 PROCEDURE — D9220A PRA ANESTHESIA: ICD-10-PCS | Mod: CRNA,,, | Performed by: NURSE ANESTHETIST, CERTIFIED REGISTERED

## 2023-09-08 PROCEDURE — 25000003 PHARM REV CODE 250: Performed by: NURSE ANESTHETIST, CERTIFIED REGISTERED

## 2023-09-08 PROCEDURE — 94760 N-INVAS EAR/PLS OXIMETRY 1: CPT

## 2023-09-08 PROCEDURE — 83735 ASSAY OF MAGNESIUM: CPT

## 2023-09-08 RX ORDER — LIDOCAINE HYDROCHLORIDE 20 MG/ML
INJECTION INTRAVENOUS
Status: DISCONTINUED | OUTPATIENT
Start: 2023-09-08 | End: 2023-09-08

## 2023-09-08 RX ORDER — PROPOFOL 10 MG/ML
VIAL (ML) INTRAVENOUS
Status: DISCONTINUED | OUTPATIENT
Start: 2023-09-08 | End: 2023-09-08

## 2023-09-08 RX ORDER — FUROSEMIDE 20 MG/1
20 TABLET ORAL DAILY
Qty: 90 TABLET | Refills: 3 | Status: SHIPPED | OUTPATIENT
Start: 2023-09-09 | End: 2024-02-22 | Stop reason: SDUPTHER

## 2023-09-08 RX ORDER — METOPROLOL SUCCINATE 100 MG/1
100 TABLET, EXTENDED RELEASE ORAL DAILY
Qty: 30 TABLET | Refills: 11 | Status: SHIPPED | OUTPATIENT
Start: 2023-09-09 | End: 2024-02-22 | Stop reason: SDUPTHER

## 2023-09-08 RX ORDER — SPIRONOLACTONE 25 MG/1
25 TABLET ORAL DAILY
Qty: 90 TABLET | Refills: 3 | Status: SHIPPED | OUTPATIENT
Start: 2023-09-09 | End: 2024-02-22 | Stop reason: SDUPTHER

## 2023-09-08 RX ORDER — LIDOCAINE HYDROCHLORIDE 10 MG/ML
INJECTION INFILTRATION; PERINEURAL
Status: COMPLETED | OUTPATIENT
Start: 2023-09-08 | End: 2023-09-08

## 2023-09-08 RX ADMIN — PROPOFOL 100 MG: 10 INJECTION, EMULSION INTRAVENOUS at 12:09

## 2023-09-08 RX ADMIN — FUROSEMIDE 20 MG: 20 TABLET ORAL at 09:09

## 2023-09-08 RX ADMIN — PROPOFOL 50 MG: 10 INJECTION, EMULSION INTRAVENOUS at 12:09

## 2023-09-08 RX ADMIN — LIDOCAINE HYDROCHLORIDE 50 MG: 20 INJECTION INTRAVENOUS at 12:09

## 2023-09-08 RX ADMIN — SODIUM CHLORIDE: 9 INJECTION, SOLUTION INTRAVENOUS at 11:09

## 2023-09-08 RX ADMIN — SACUBITRIL AND VALSARTAN 1 TABLET: 24; 26 TABLET, FILM COATED ORAL at 09:09

## 2023-09-08 RX ADMIN — VENLAFAXINE HYDROCHLORIDE 75 MG: 75 CAPSULE, EXTENDED RELEASE ORAL at 09:09

## 2023-09-08 RX ADMIN — METOPROLOL SUCCINATE 100 MG: 50 TABLET, FILM COATED, EXTENDED RELEASE ORAL at 09:09

## 2023-09-08 RX ADMIN — LIDOCAINE HYDROCHLORIDE 10 ML: 10 INJECTION, SOLUTION INFILTRATION; PERINEURAL at 12:09

## 2023-09-08 RX ADMIN — SPIRONOLACTONE 25 MG: 25 TABLET ORAL at 09:09

## 2023-09-08 RX ADMIN — LEVOFLOXACIN 750 MG: 750 TABLET, FILM COATED ORAL at 09:09

## 2023-09-08 RX ADMIN — ONDANSETRON 8 MG: 4 TABLET, ORALLY DISINTEGRATING ORAL at 09:09

## 2023-09-08 NOTE — ANESTHESIA PREPROCEDURE EVALUATION
09/08/2023  Todd Bernal is a 48 y.o., male with PMHx of HTN, HFrEF presents for ROXANNE to evaluate severity of MRNeeraj    Metoprolol--last dose 9/8/23 @ 0928    Active Ambulatory Problems     Diagnosis Date Noted    No Active Ambulatory Problems     Resolved Ambulatory Problems     Diagnosis Date Noted    No Resolved Ambulatory Problems     No Additional Past Medical History           Pre-op Assessment    I have reviewed the NPO Status.      Review of Systems  Anesthesia Hx:  No problems with previous Anesthesia    Social:  Former Smoker    Cardiovascular:   Hypertension, well controlled CHF    Renal/:  Renal/ Normal     Hepatic/GI:  Hepatic/GI Normal    Neurological:  Neurology Normal    Endocrine:  Endocrine Normal      Vitals:    09/08/23 0407 09/08/23 0600 09/08/23 0808 09/08/23 0809   BP: 121/83  112/80    Pulse: 90  88    Resp: 18      Temp: 36.6 °C (97.9 °F)  36.5 °C (97.7 °F)    TempSrc: Oral  Oral    SpO2: 96%  100% 100%   Weight:  54 kg (119 lb)     Height:             Physical Exam  General: Alert and Cooperative    Airway:  Mallampati: II   Mouth Opening: Normal  TM Distance: Normal  Tongue: Normal  Neck ROM: Normal ROM    Dental:  Intact    Chest/Lungs:  Normal Respiratory Rate, Clear to auscultation    Heart:  Rate: Normal  Rhythm: Regular Rhythm  Sounds: Normal      Lab Results   Component Value Date    WBC 6.82 09/08/2023    HGB 14.4 09/08/2023    HCT 43.9 09/08/2023    MCV 92.6 09/08/2023     09/08/2023       CMP  Sodium Level   Date Value Ref Range Status   09/08/2023 142 136 - 145 mmol/L Final     Potassium Level   Date Value Ref Range Status   09/08/2023 4.3 3.5 - 5.1 mmol/L Final     Carbon Dioxide   Date Value Ref Range Status   09/08/2023 23 22 - 29 mmol/L Final     Blood Urea Nitrogen   Date Value Ref Range Status   09/08/2023 21.6 (H) 8.9 - 20.6 mg/dL Final     Creatinine    Date Value Ref Range Status   09/08/2023 0.72 (L) 0.73 - 1.18 mg/dL Final     Calcium Level Total   Date Value Ref Range Status   09/08/2023 9.0 8.4 - 10.2 mg/dL Final     Albumin Level   Date Value Ref Range Status   09/08/2023 3.1 (L) 3.5 - 5.0 g/dL Final     Bilirubin Total   Date Value Ref Range Status   09/08/2023 0.3 <=1.5 mg/dL Final     Alkaline Phosphatase   Date Value Ref Range Status   09/08/2023 76 40 - 150 unit/L Final     Aspartate Aminotransferase   Date Value Ref Range Status   09/08/2023 18 5 - 34 unit/L Final     Alanine Aminotransferase   Date Value Ref Range Status   09/08/2023 17 0 - 55 unit/L Final     eGFR   Date Value Ref Range Status   09/08/2023 >60 mls/min/1.73/m2 Final                 Anesthesia Plan  Type of Anesthesia, risks & benefits discussed:    Anesthesia Type: Gen Natural Airway  Intra-op Monitoring Plan: Standard ASA Monitors  Post Op Pain Control Plan: IV/PO Opioids PRN  Induction:  IV  ASA Score: 4  Day of Surgery Review of History & Physical: H&P Update referred to the surgeon/provider.    Ready For Surgery From Anesthesia Perspective.     .

## 2023-09-08 NOTE — PROGRESS NOTES
"Cardiology Consult Note     Cardiology Attending: Dr. Fonseca   Cardiology Fellow: Molly Serrano DO     Date of Admit: 9/3/2023  Date of Consult: 9/8/2023    Reason for Consultation:     "New-Onset HFrEF"    History of Present Illness:      Brief HPI: Todd Bernal is a 48 y.o. male with no significant PMH who presented to the ED on 9/4 with complaints of SOB x1 week. 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 SOB started after exposure. On arrival noted to have elevated BNP and Cardiomegaly. Subsequent workup showed severely reduced EF with moderate to severe MR. Patient also endorses family history of premature CAD. Cardiology has been consulted for further evaluation.     Interval History:  Patient was seen and examined this morning. He reports he is doing well. No complaints today. He is slightly irritated over not being able to eat but he understands.     Past Medical History:   History reviewed. No pertinent past medical history.  Surgical History:     Past Surgical History:   Procedure Laterality Date    ANGIOGRAM, CORONARY, WITH LEFT HEART CATHETERIZATION N/A 9/6/2023    Procedure: Angiogram, Coronary, with Left Heart Cath;  Surgeon: Leax Fonseca MD;  Location: [facility] CATH LAB;  Service: Cardiology;  Laterality: N/A;     Allergies:     Review of patient's allergies indicates:   Allergen Reactions    Phenergan [promethazine]      Family History:   History reviewed. No pertinent family history.  Social History:     Social History     Tobacco Use    Smoking status: Never    Smokeless tobacco: Never   Substance Use Topics    Alcohol use: Never    Drug use: Never     Review of Systems:     ROS completed and negative except as above.     Medications:     Home Medications:  Prior to Admission medications    Not on File       Current/Inpatient Medications:  Infusions:   nitroGLYCERIN in 5 % dextrose       Scheduled:   diazePAM  5 mg Oral Once    enoxparin  40 mg Subcutaneous Daily    furosemide  20 " "mg Oral Daily    levoFLOXacin  750 mg Oral Daily    metoprolol succinate  100 mg Oral Daily    sacubitriL-valsartan  1 tablet Oral BID    spironolactone  25 mg Oral Daily    venlafaxine  75 mg Oral Daily     PRN:  acetaminophen, acetaminophen, labetalol, nitroGLYCERIN in 5 % dextrose, ondansetron, sodium chloride 0.9%, sodium chloride 0.9%, sodium chloride 0.9%    Objective:     24-hour Vitals:   Temp:  [97.5 °F (36.4 °C)-97.9 °F (36.6 °C)] 97.7 °F (36.5 °C)  Pulse:  [83-97] 88  Resp:  [18] 18  SpO2:  [96 %-100 %] 100 %  BP: (100-128)/(69-85) 112/80    Vitals: /80   Pulse 88   Temp 97.7 °F (36.5 °C) (Oral)   Resp 18   Ht 5' 5" (1.651 m)   Wt 54 kg (119 lb)   SpO2 100%   BMI 19.80 kg/m²     Intake/Output Summary (Last 24 hours) at 9/8/2023 0829  Last data filed at 9/8/2023 0637  Gross per 24 hour   Intake 240 ml   Output 400 ml   Net -160 ml         Physical Exam  Constitutional:       Appearance: Normal appearance.   HENT:      Head: Normocephalic and atraumatic.   Eyes:      Conjunctiva/sclera: Conjunctivae normal.   Cardiovascular:      Rate and Rhythm: Normal rate and regular rhythm.   Pulmonary:      Effort: Pulmonary effort is normal.      Breath sounds: Normal breath sounds.   Abdominal:      General: Bowel sounds are normal.      Palpations: Abdomen is soft.   Musculoskeletal:      Right lower leg: No edema.      Left lower leg: No edema.   Skin:     General: Skin is warm and dry.   Neurological:      Mental Status: He is alert. Mental status is at baseline.   Psychiatric:         Mood and Affect: Mood normal.         Thought Content: Thought content normal.         Judgment: Judgment normal.        Labs:   I have reviewed the following labs below:    Recent Labs   Lab 09/03/23  2243 09/04/23  0424 09/04/23  1152 09/05/23  0328 09/06/23  0350 09/07/23  0618 09/08/23  0610   WBC 10.15   < >  --    < > 8.77 7.49 6.82   HGB 13.8*   < >  --    < > 14.5 14.6 14.4   HCT 42.2   < >  --    < > 43.3 44.1 " 43.9      < >  --    < > 331 334 348      < >  --    < > 141 141 142   K 3.8   < >  --    < > 3.6 4.4 4.3   CO2 25   < >  --    < > 26 26 23   BUN 15.7   < >  --    < > 18.3 20.9* 21.6*   CREATININE 0.68*   < >  --    < > 0.70* 0.82 0.72*   CALCIUM 8.8   < >  --    < > 9.0 9.1 9.0   MG 1.80   < >  --    < > 2.10 2.10 2.20   PHOS  --    < >  --    < > 4.0 3.1 3.5   ALBUMIN 3.3*   < >  --    < > 3.0* 2.9* 3.1*   BILITOT 0.3   < >  --    < > 0.5 0.4 0.3   AST 21   < >  --    < > 15 16 18   ALT 19   < >  --    < > 13 16 17   ALKPHOS 93   < >  --    < > 82 73 76   PTT 31.0  --   --   --   --   --   --    INR 1.0  --   --   --   --   --   --    TROPONINI <0.010  --  <0.010  --   --   --   --    .5*  --   --   --   --   --   --     < > = values in this interval not displayed.       Cardiovascular Studies/Imaging:   I have reviewed the following studies below:    TTE (9/5/2023):   Left Ventricle: The left ventricle is moderately dilated. There is severely reduced systolic function with a visually estimated ejection fraction of 15 - 20%.    Left Atrium: Left atrium is dilated. LA volume index is 62ml/m2.    Right Ventricle: Normal right ventricular cavity size. Systolic function is normal.    Right Atrium: Right atrium is mildly dilated.    Aortic Valve: The aortic valve is a trileaflet valve.    Mitral Valve: There is moderate to severe regurgitation.    Tricuspid Valve: There is mild to moderate regurgitation.    Pulmonary Artery: The estimated pulmonary artery systolic pressure is 50 mmHg.    IVC/SVC: Normal venous pressure at 3 mmHg.    Pericardium: There is a small effusion. Bilateral pleural effusions.      Imaging:   X-Ray Chest 1 View - 9/5/2023  EXAMINATION: XR CHEST 1 VIEW CPT 56056 CLINICAL HISTORY: pna; COMPARISON: September 3, 2023 FINDINGS: Examination reveals cardiomediastinal silhouette and pleuroparenchymal changes to be essentially unchanged as compared with the previous exam. Bilateral  pleural effusions are again identified. Increased left retrocardiac density and silhouetting of the left hemidiaphragm is seen which might be related to pleural fluid and or represent an infiltrate/atelectasis       11:47    X-Ray Chest 1 View - 9/4/2023  EXAMINATION: XR CHEST 1 VIEW CLINICAL HISTORY: Dyspnea; COMPARISON: None FINDINGS: Single view of the chest shows bilateral effusions with dependent patchy ground-glass opacities and central vascular congestion.  No pneumothorax.  Heart is upper normal in size. Findings suggestive of fluid overload/heart failure       CTA CHEST NON CORONARY (PE STUDIES) - 9/4/2023    Technique: CT Scan of the chest was performed with intravenous contrast with direct axial images as well as sagittal and coronal reconstruction images pulmonary embolus protocol.  Dosage Information: Automated Exposure Control was utilized 178.05 mGy.cm.  Comparison: None.  Clinical History: Pulmonary embolism (PE) suspected, positive D-dimer.  Findings:Artifact: Streak artifact is seen in the right anterior chest wall partially obscuring the adjacent anatomy.  Soft Tissues: The visualized soft tissues of the chest wall appear unremarkable.  Axilla: A few mildly prominent lymph nodes are seen in the left and right axilla.  Neck: The visualized soft tissues of the neck appear unremarkable.  Mediastinum: The mediastinal structures are within normal limits.  Heart: Mild cardiomegaly is seen.  Aorta: Unremarkable appearing aorta. No aortic dissection or aneurysm is seen.  Pulmonary Arteries: No filling defects are seen in the pulmonary arteries to suggest pulmonary embolus.  Lungs: There are areas of patchy ground-glass opacity with some central predominance with some additional areas of dense patchy opacity tending towards consolidation at the lung bases. The patient has bilateral moderate to large pleural effusions.  Bony Structures:Spine: Mild multilevel spondylotic changes are seen in the thoracic  spine. Mild anterior compression deformity of L1 is demonstrated.  Ribs: The visualized ribs appear unremarkable.  Abdomen: Incidental note is made of a left renal cyst arising from the superior pole of the visualized left kidney. The rest of the visualized upper abdominal organs appear unremarkable.  Impression:1. Mild cardiomegaly is seen. 2. No filling defects are seen in the pulmonary arteries to suggest pulmonary embolus. 3. There are areas of patchy ground-glass opacity with some central predominance with some additional areas of dense patchy opacity tending towards consolidation at the lung bases. The patient has bilateral moderate to large pleural effusions. These findings are suggestive of multifocal possibly atypical infectious pneumonia with probable concurrent areas of dense possibly compressive atelectasis at the lung bases. Correlate with clinical and laboratory findings as regards additional evaluation and follow-up to full resolution. 4. Details and other findings as discussed above.  I concur with the preliminary report above.    Assessment & Plan:     New-Onset Heart Failure with Reduced Ejection Fraction    - Etiology: undetermined but likely 2/2 MR   - HF Status: NYHA Class II-III, ACC/AHA Stage C   - LVEF: 15-20% (TTE on 9/5/2023)    - Continue Entresto, spironolactone, Metroprolol    - HR still not at goal - will evaluate and clinic and likely increase metoprolol further   - agree with PO diuretics as patient appears euvolemic on exam    - If tolerating well, recommend adding Jardiance prior to discharge    - Strict Is/Os and daily weights   - ischemic workup (LHC) negative    - Plan for ROXANNE tomorrow today     Mitral Regurgitation    - noted to be moderate to severe on TTE   - valvulopathy concerning for cause of HFrEF  - if ROXANNE confirms severity of MR, will set up to see CT surgery in [facility] for evaluation   - Plan for ROXANNE today for further evaluation  - keep NPO for now    HTN   - BP  approaching goal - will make further adjustments to obtain SBP>110 at follow-up   - please send patient home with BP Cuff and BP/HR log and instruct him to bring to his cardiology appointment for further GDMT titration    - Continue Low Na Diet and Exercise as tolerated     Tachycardia   - HR goal <70    - continue metoprolol    - please send patient home with BP Cuff and BP/HR log and instruct him to bring to his cardiology appointment for further GDMT titration     Thank you for allowing us to participate in the care of this patient. We will continue to follow along for now.       [facility] Cardiology Clinic Appointment Information  Date: 9/22/2023  Time: 8:45AM  [facility]   Provider: Dalila Serrano DO  U Cardiology Fellow, PGY-6  09/08/2023 1:52 PM

## 2023-09-08 NOTE — DISCHARGE SUMMARY
[facility] Internal Medicine Discharge Summary    Admitting Physician: Lv Montiel MD  Attending Physician: Lv Montiel MD  Resident: Al   Intern: Mary  Date of Admit: 9/3/2023  Date of Discharge: 9/8/2023    Condition: Stable  Outcome: Condition has improved and patient is now ready for discharge.  DISPOSITION: Home or Self Care    Discharge Diagnoses     Patient Active Problem List   Diagnosis    Nonrheumatic mitral valve regurgitation    HFrEF (heart failure with reduced ejection fraction)    Acute on chronic HFrEF (heart failure with reduced ejection fraction)       Principal Problem:  HFrEF (heart failure with reduced ejection fraction)    Consultants and Procedures     Consultants:  IP CONSULT TO HOSPITAL MEDICINE  IP CONSULT TO CARDIOLOGY    Procedures:   Procedure(s) (LRB):  Angiogram, Coronary, with Left Heart Cath (N/A)     Brief Admission History      Todd Bernal is a 48 y.o.male with no significant past medical history presented to the ED on 9/3/2023  with a primary complaint of Shortness of Breath (Pt arrives with c/o SOB that started about a week ago; pt denies any medical hx O2 sat 91% on room air)  . 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 He reports that for 2 days after his exposure he had SOB, and then his symptoms resolved. Approximately 3 days ago he returned to working in garage, and then had a similar episode of SOB that has gotten progressively worse. 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 He endorses a dry cough. He denies any fever, chills, nausea, or vomiting. He reports no recent sick contacts. He reports that his brother was working in the same conditions and did not have any symptoms. He denies ever having any similar episodes of SOB in the past, and  denies any history of asthma or allergic reactions.   In the ED the patient was afebrile, tachycardic to 133, tachypnic to 30. BP was elevated to 160/123. O2 was lower 90s on RA. Electrolytes were wnl, Cr was 0.68.  CBC was non remarkable. Troponin was negative, BNP was 835.5. D-dimer was 0.80. CTA chest was negative for PE, but showed some mild cardiomegaly, and patchy ground-glass opacities tending towards consolidation at the lung bases suggestive of multifocal possibly atypical infectious pneumonia. In the ED he was placed on CPAP, nitro drip, and given 40 mg Lasix. The patient was admitted for workup of new onset CHF vs pneumonia.     Hospital Course with Pertinent Findings     Patient was hospitalized for a duration of 5 days. Echo was completed day after admission which was significant for EF 15-20% and moderate to severe regurgitation of mitral and tricuspid valves. Inpatient Cardiology service was consulted. GDMT was started including Entresto BID, Toprol, Aldactone, and Lasix. Recommended Jardiance at discharge. Per cardiology, medications were titrated with systolic blood pressure goal < and HR<70. Angiogram done on 9/6 which was negative for infectious process, TTE was done on day of discharge. If finalized results confirm severe valvular dysfunction, outpatient [facility] Cardiology will refer to CT Surgery in [place] for further treatment. Patient is stable for discharge on 9/8/23 with close cardiology follow-up in place.     Echo    Result Date: 9/5/2023    Left Ventricle: The left ventricle is moderately dilated. There is   severely reduced systolic function with a visually estimated ejection   fraction of 15 - 20%.    Left Atrium: Left atrium is dilated. LA volume index is 62ml/m2.    Right Ventricle: Normal right ventricular cavity size. Systolic   function is normal.    Right Atrium: Right atrium is mildly dilated.    Aortic Valve: The aortic valve is a trileaflet valve.    Mitral Valve: There  "is moderate to severe regurgitation.    Tricuspid Valve: There is mild to moderate regurgitation.    Pulmonary Artery: The estimated pulmonary artery systolic pressure is   50 mmHg.    IVC/SVC: Normal venous pressure at 3 mmHg.    Pericardium: There is a small effusion. Bilateral pleural effusions.          Discharge physical exam:  Vitals  BP: 98/68  Temp: 97.6 °F (36.4 °C)  Temp Source: Oral  Pulse: 91  Resp: 18  SpO2: 96 %  Height: 5' 5" (165.1 cm)  Weight: 54 kg (119 lb)    Physical Exam  Vitals and nursing note reviewed.   Constitutional:       General: He is not in acute distress. Appears thin.      Appearance: He is not ill-appearing or diaphoretic.   HENT:      Nose: No congestion or rhinorrhea.   Neck:      Vascular: No hepatojugular reflux or JVD.   Cardiovascular:      Rate and Rhythm: Regular rhythm and rate.    Pulmonary:      Effort: Pulmonary effort is normal. No respiratory distress.      Breath sounds: Normal breath sounds. No crackles, wheezing, rhonchi or rales.      Comments: SpO2>93% on RA  Musculoskeletal:      Right lower leg: No edema.      Left lower leg: No edema.   Skin:     Capillary Refill: Capillary refill takes less than 2 seconds.   Neurological:      Mental Status: He is alert and oriented to person, place, and time.    TIME SPENT ON DISCHARGE: 60 minutes    Discharge Medications        Medication List        START taking these medications      empagliflozin 10 mg tablet  Commonly known as: JARDIANCE  Take 1 tablet (10 mg total) by mouth once daily.     furosemide 20 MG tablet  Commonly known as: LASIX  Take 1 tablet (20 mg total) by mouth once daily.  Start taking on: September 9, 2023     metoprolol succinate 100 MG 24 hr tablet  Commonly known as: TOPROL-XL  Take 1 tablet (100 mg total) by mouth once daily.  Start taking on: September 9, 2023     sacubitriL-valsartan 24-26 mg per tablet  Commonly known as: ENTRESTO  Take 1 tablet by mouth 2 (two) times daily.     spironolactone 25 " MG tablet  Commonly known as: ALDACTONE  Take 1 tablet (25 mg total) by mouth once daily.  Start taking on: September 9, 2023               Where to Get Your Medications        These medications were sent to [address] [address]      Phone: [phone]   empagliflozin 10 mg tablet  furosemide 20 MG tablet  metoprolol succinate 100 MG 24 hr tablet  sacubitriL-valsartan 24-26 mg per tablet  spironolactone 25 MG tablet         Discharge Information:      Follow-up Information       [facility]. Schedule an appointment as soon as possible for a visit in 1 week(s).    Specialty: Internal Medicine  Why:  will call to make a [facility] appointment in 1-2 weeks  Contact information:  [address] [place] [id number] [phone]             [facility]. Go to.    Why: Go to the Emergency Room if symptoms worsen  Contact information:  [address] [place] [id number] [phone]             [facility]. Go in 14 day(s).    Specialty: Cardiology  Why: Appointment already made for 9/22/23 at 8:45AM   Cardiology Clinic 7th Floor  Contact information:  [address] [place] [id number] [phone]                          If TTE confirms severe mitral valve regurgitation, [facility] Cardiology will look into CT Surgery referral at [facility]. To be managed by [facility] Cardiology.     ED precautions given.     Lg Hernandez MD  [facility] Internal Medicine HO-1

## 2023-09-08 NOTE — ANESTHESIA POSTPROCEDURE EVALUATION
Anesthesia Post Evaluation    Patient: Todd Bernal    Procedure(s) Performed: * No procedures listed *    Final Anesthesia Type: general      Patient location during evaluation: Cath Lab  Patient participation: Yes- Able to Participate  Level of consciousness: awake and alert  Post-procedure vital signs: reviewed and stable  Pain management: adequate  Airway patency: patent    PONV status at discharge: No PONV  Anesthetic complications: no      Cardiovascular status: hemodynamically stable  Respiratory status: unassisted, room air and spontaneous ventilation  Hydration status: euvolemic  Follow-up not needed.          Vitals Value Taken Time       No case tracking events are documented in the log.      Pain/Prasad Score: No data recorded

## 2023-09-08 NOTE — TRANSFER OF CARE
Anesthesia Transfer of Care Note    Patient: Todd Bernal    Procedure(s) Performed: * No procedures listed *    Patient location: Cath Lab    Anesthesia Type: general    Post pain: adequate analgesia    Post assessment: no apparent anesthetic complications and tolerated procedure well    Post vital signs: stable    Level of consciousness: awake    Nausea/Vomiting: no nausea/vomiting    Complications: none    Transfer of care protocol was followed      Last vitals:

## 2023-09-09 LAB
BACTERIA BLD CULT: NORMAL
BACTERIA BLD CULT: NORMAL
BACTERIA SPEC ANAEROBE CULT: NORMAL
LEGIONELLA RRNA SPEC QL PROBE: NEGATIVE
MAYO GENERIC ORDERABLE RESULT: NORMAL
SPECIMEN SOURCE: NORMAL

## 2023-09-11 ENCOUNTER — OFFICE VISIT (OUTPATIENT)
Dept: INTERNAL MEDICINE | Facility: CLINIC | Age: 48
End: 2023-09-11
Payer: MEDICAID

## 2023-09-11 VITALS
DIASTOLIC BLOOD PRESSURE: 58 MMHG | SYSTOLIC BLOOD PRESSURE: 91 MMHG | OXYGEN SATURATION: 98 % | HEIGHT: 66 IN | BODY MASS INDEX: 17.94 KG/M2 | TEMPERATURE: 98 F | HEART RATE: 87 BPM | RESPIRATION RATE: 20 BRPM | WEIGHT: 111.63 LBS

## 2023-09-11 DIAGNOSIS — I50.20 HFREF (HEART FAILURE WITH REDUCED EJECTION FRACTION): ICD-10-CM

## 2023-09-11 LAB — BACTERIA FLD CULT: NORMAL

## 2023-09-11 PROCEDURE — 99214 OFFICE O/P EST MOD 30 MIN: CPT | Mod: PBBFAC

## 2023-09-11 RX ORDER — VENLAFAXINE HYDROCHLORIDE 75 MG/1
75 CAPSULE, EXTENDED RELEASE ORAL
COMMUNITY
Start: 2023-09-05

## 2023-09-11 NOTE — PROGRESS NOTES
Heart Failure Pharmacy Follow Up Visit:  Patient discharged 9/8/23  Discharge Medications:  START taking these medications       empagliflozin 10 mg tablet  Commonly known as: JARDIANCE  Take 1 tablet (10 mg total) by mouth once daily.    furosemide 20 MG tablet  Commonly known as: LASIX  Take 1 tablet (20 mg total) by mouth once daily.  Start taking on: September 9, 2023    metoprolol succinate 100 MG 24 hr tablet  Commonly known as: TOPROL-XL  Take 1 tablet (100 mg total) by mouth once daily.  Start taking on: September 9, 2023    sacubitriL-valsartan 24-26 mg per tablet  Commonly known as: ENTRESTO  Take 1 tablet by mouth 2 (two) times daily.    spironolactone 25 MG tablet  Commonly known as: ALDACTONE  Take 1 tablet (25 mg total) by mouth once daily.  Start taking on: September 9, 2023       Discussed patients discharge medications.    Patient is compliant and taking all medications as prescribed.  Patient is also taking effexor 75mg once daily.    Patient has been tired.  Most likely due to stopping energy drinks.  Patient had moved   His effexor to the morning instead of evening.  Recommended returning to taking at bedtime.     Discussed fluid pills .  Discussed with patient the importance of taking and recording daily weights.  Discussed briefly how patient is doing with their heart healthy diet.  Reminded patient of upcoming appointment on  cardiology 9/22/23 0845  Instructed if symptoms worsen to return to the emergency room.

## 2023-09-11 NOTE — PROGRESS NOTES
"[facility] Internal Medicine Clinic Visit    Chief Complaint:      Follow-up (Hospital follow up.)     Subjective:     HPI:  Todd Bernal is a 48 y.o. male with no significant PMH.  He presents to clinic today for follow-up for his admission with new onset CHF.     Today, he is compliant with his medications, does not complain of side effects, though does mention feeling tired, but he relates it to cutting on caffeine as he used to drink multiple energy drinks before admission.  He explained pretty well his GDMT regimen, limiting his water intake to 1500 mL, reducing salt intake, monitoring his blood pressure, weight, and leg swelling.  He does not complain of SOB, orthopnea, leg swelling, or chest pain. No nausea, vomiting, diarrhea, constipation.  ROXANNE was done on discharge, EF 20-25%, no mitral regurgitation was observed.      Review of Systems  A comprehensive 12 point review of systems was completed.  Please see above for pertinent positives and negatives.     Objective:   Last 24 Hour Vital Signs:  Vitals  BP: (!) 91/58  Temp: 97.6 °F (36.4 °C)  Pulse: 87  Resp: 20  SpO2: 98 %  Height: 5' 6" (167.6 cm)  Weight: 50.6 kg (111 lb 9.6 oz)    Physical Examination:  General: Awake, alert, & oriented to person, place & time. No acute distress  Psychiatric: Mood and affect normal  HEENT: Normocephalic, atraumatic. Face symmetric.  Cardiovascular: Regular rate & rhythm. Normal S1 & S2 w/out murmurs, rubs or gallops.  Pulmonary: Bilateral symmetric chest rise. Non-labored, no wheezes, rhonchi or crackles are appreciated.  Abdominal:  Nondistended. Bowel sounds present  Extremities: No clubbing, cyanosis or edema.  Skin:  Exposed skin is warm & dry.  Neuro:   Patient moves all extremities equally. Sensation intact bilateraly.    Labs  @[facility]@, @[facility]@     Assessment & Plan:     New CHF  -Continue Jardiance 10 mg Daily, Lasix 20 mg daily, Toprol 100 mg daily, Aldactone 25 mg daily, and Entresto 24-26 BID  -restrict " water intake to 1500 mL a day, slat intake no more than 2000 mg per day  -yearly TTE  -f/u with cardiology    To be addressed at next follow-up  -BP, HR, weight change, CHF sings and symptoms, hep c, pneumococcal vaccine, colon screening, tetanus vaccine.      Health Maintenance/ Wellness  Pneumococcal vaccine: due, will discuss during PCP visit  TDAP: due, will discuss during PCP visit   Influenza vaccine: due, will discuss during PCP visit  Shingrix vaccine: due, will discuss during PCP visit  Screening colonoscopy:  due, will discuss during PCP visit      Labs ordered: CBC,CMP, A1C, lipid panel, HCV antibodies  Imaging: none  Medications: reconciled, discussed and refills given.    Follow-ups  -Follow-up medicine clinic in 3 m      Yu Aguilera MD  Internal Medicine - PGY-1

## 2023-09-13 ENCOUNTER — PATIENT OUTREACH (OUTPATIENT)
Dept: ADMINISTRATIVE | Facility: CLINIC | Age: 48
End: 2023-09-13
Payer: MEDICAID

## 2023-09-13 NOTE — PROGRESS NOTES
CHIEF COMPLAINT:   Chief Complaint   Patient presents with    F/U hospital stay     Denies any cardiac problems, feeling tired                                                  HPI:  Todd Bernal 48 y.o. male with no significant PMH who presents to Cardiology Clinic to establish care.  Of note, he presented to [facility] ED on 9/4 with complaints of SOB x1 week. 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 SOB started after exposure. On arrival noted to have elevated BNP and Cardiomegaly. Subsequent workup showed severely reduced EF with moderate to severe MR on TTE.  Patient underwent LHC which revealed no evidence of coronary artery disease, all coronaries were normal.  Patient also underwent ROXANNE which revealed an EF of 20-25%, no evidence of valvular disease.  Patient endorsed family history of premature CAD.  He was seen by Dr. Fonseca while inpatient.     Today patient states that he is feeling pretty well overall.  He denies any chest pain, shortness a breath, PND, orthopnea, lightheadedness, dizziness, syncope, or claudication symptoms.  He does endorse overall fatigue and tiredness, but states that he recently discontinued Adderall and believes this may be related to that.  He states that he is able to complete his ADLs without any issues or ischemic symptoms.  He states that he has been feeling better since being discharged from the hospital earlier in the month.  He states that he has not been exercising very often but he is active in his day-to-day life.  He reports compliance with all medications.  He denied any tobacco or alcohol use.                                                                                                                                                                                                                                                                                             CARDIAC TESTING:    ROXANNE 9.8.23    Left Ventricle: The left ventricle is moderately  dilated. Normal wall thickness. Global hypokinesis present. There is severely reduced systolic function with a visually estimated ejection fraction of 20 - 25%.    Left Atrium: Left atrium was not well visualized.    Right Ventricle: Normal right ventricular cavity size. Systolic function is normal.    Aortic Valve: The aortic valve is a trileaflet valve.    Echo 9.3.23    Left Ventricle: The left ventricle is moderately dilated. There is severely reduced systolic function with a visually estimated ejection fraction of 15 - 20%.    Left Atrium: Left atrium is dilated. LA volume index is 62ml/m2.    Right Ventricle: Normal right ventricular cavity size. Systolic function is normal.    Right Atrium: Right atrium is mildly dilated.    Aortic Valve: The aortic valve is a trileaflet valve.    Mitral Valve: There is moderate to severe regurgitation    Tricuspid Valve: There is mild to moderate regurgitation.    Pulmonary Artery: The estimated pulmonary artery systolic pressure is 50 mmHg.    IVC/SVC: Normal venous pressure at 3 mmHg.    Pericardium: There is a small effusion. Bilateral pleural effusions.    Cardiac catheterization 9.3.23    Conclusion    The pre-procedure left ventricular end diastolic pressure was 20.    The estimated blood loss was <50 mL.    The coronary arteries were normal..    FINDINGS  LVEDP:  20 mmHg  Left Main:  No stenosis  LAD:  No stenosis  Circumflex:  No stenosis  RCA:  No stenosis  The patient has No CAD  Blood loss:  less than 10 cc.    RECOMMENDATIONS  Risk factor modifications  Activity -- avoid straining with affected limb for one week  Exercise -- as tolerated  Precautions post D/C -- come to ER for hematoma, unusual pain, erythema, or unusual drainage at access site  Precautions post D/C -- if develop bleeding or hematoma at access site, hold pressure at access site, and come to ER    POST-CATH GUIDELINE FOR INPATIENT DISCHARGE  No hematoma or swelling at access site  No unusual  tenderness at access site  Adequate distal pulse or capillary refill in limb(s) accessed  No unusual difficulty with ambulation after bed rest is over        Patient Active Problem List   Diagnosis    Nonrheumatic mitral valve regurgitation    HFrEF (heart failure with reduced ejection fraction)    Acute on chronic HFrEF (heart failure with reduced ejection fraction)    Tachycardia     Past Surgical History:   Procedure Laterality Date    ANGIOGRAM, CORONARY, WITH LEFT HEART CATHETERIZATION N/A 9/6/2023    Procedure: Angiogram, Coronary, with Left Heart Cath;  Surgeon: Lexa Fonseca MD;  Location: [facility] CATH LAB;  Service: Cardiology;  Laterality: N/A;     Social History     Socioeconomic History    Marital status: Unknown   Tobacco Use    Smoking status: Never    Smokeless tobacco: Never   Substance and Sexual Activity    Alcohol use: Never    Drug use: Never     Social Determinants of Health     Financial Resource Strain: Low Risk  (9/5/2023)    Overall Financial Resource Strain (CARDIA)     Difficulty of Paying Living Expenses: Not hard at all   Food Insecurity: No Food Insecurity (9/5/2023)    Hunger Vital Sign     Worried About Running Out of Food in the Last Year: Never true     Ran Out of Food in the Last Year: Never true   Transportation Needs: No Transportation Needs (9/5/2023)    PRAPARE - Transportation     Lack of Transportation (Medical): No     Lack of Transportation (Non-Medical): No   Physical Activity: Inactive (9/5/2023)    Exercise Vital Sign     Days of Exercise per Week: 0 days     Minutes of Exercise per Session: 0 min   Stress: No Stress Concern Present (9/5/2023)    [employer] [place] [employer] - Occupational Stress Questionnaire     Feeling of Stress : Not at all   Social Connections: Socially Isolated (9/5/2023)    Social Connection and Isolation Panel [NHANES]     Frequency of Communication with Friends and Family: More than three times a week     Frequency of Social  "Gatherings with Friends and Family: More than three times a week     Attends Rastafarian Services: Never     Active Member of Clubs or Organizations: No     Attends Club or Organization Meetings: Never     Marital Status: Never    Housing Stability: Low Risk  (9/5/2023)    Housing Stability Vital Sign     Unable to Pay for Housing in the Last Year: No     Number of Places Lived in the Last Year: 1     Unstable Housing in the Last Year: No        History reviewed. No pertinent family history.  Review of patient's allergies indicates:   Allergen Reactions    Phenergan [promethazine]          ROS:  Review of Systems   Constitutional:  Positive for malaise/fatigue.   HENT: Negative.     Eyes: Negative.    Respiratory: Negative.  Negative for shortness of breath.    Cardiovascular: Negative.  Negative for chest pain, palpitations, orthopnea, claudication, leg swelling and PND.   Gastrointestinal: Negative.    Genitourinary: Negative.    Musculoskeletal: Negative.    Skin: Negative.    Neurological: Negative.  Negative for dizziness and weakness.   Endo/Heme/Allergies: Negative.    Psychiatric/Behavioral:  The patient is nervous/anxious.                                                                                                                                                                                 Negative except as stated in the history of present illness. See HPI for details.    PHYSICAL EXAM:  Visit Vitals  /68 (BP Location: Right arm, Patient Position: Sitting, BP Method: Medium (Automatic))   Pulse 72   Temp 98.1 °F (36.7 °C) (Oral)   Resp 20   Ht 5' 6" (1.676 m)   Wt 53 kg (116 lb 13.5 oz)   SpO2 98%   BMI 18.86 kg/m²       Physical Exam  Constitutional:       Appearance: Normal appearance.   HENT:      Head: Normocephalic.   Eyes:      Extraocular Movements: Extraocular movements intact.   Neck:      Vascular: No carotid bruit.   Cardiovascular:      Rate and Rhythm: Normal rate and regular " rhythm.      Pulses: Normal pulses.      Heart sounds: Normal heart sounds.   Pulmonary:      Effort: Pulmonary effort is normal.      Breath sounds: Normal breath sounds.   Abdominal:      General: There is no distension.   Musculoskeletal:         General: Normal range of motion.      Right lower leg: No edema.      Left lower leg: No edema.   Skin:     General: Skin is warm and dry.   Neurological:      General: No focal deficit present.      Mental Status: He is alert and oriented to person, place, and time.      Motor: No weakness.   Psychiatric:         Mood and Affect: Mood normal.         Behavior: Behavior normal.         Current Outpatient Medications   Medication Instructions    empagliflozin (JARDIANCE) 10 mg, Oral, Daily    furosemide (LASIX) 20 mg, Oral, Daily    metoprolol succinate (TOPROL-XL) 100 mg, Oral, Daily    sacubitriL-valsartan (ENTRESTO) 24-26 mg per tablet 1 tablet, Oral, 2 times daily    spironolactone (ALDACTONE) 25 mg, Oral, Daily    venlafaxine (EFFEXOR-XR) 75 mg, Oral        All medications, laboratory studies, cardiac diagnostic imaging reviewed.     Lab Results   Component Value Date    LDL 88.00 09/04/2023    TRIG 80 09/04/2023    CREATININE 0.72 (L) 09/08/2023    MG 2.20 09/08/2023    K 4.3 09/08/2023        ASSESSMENT/PLAN:     New-Onset Heart Failure with Reduced Ejection Fraction    - Asymptomatic today (see HPI)    - Etiology: undetermined   - HF Status: NYHA Class II-III, ACC/AHA Stage C   - LVEF: 15-20% (TTE on 9.5.23)    - LVEF: 20-25% (ROXANNE on 9.8.23)   - Continue Entresto, Spironolactone, Metroprolol, and Jardiance - medication started while inpatient, tolerating well   - Continue Lasix PRN   - Strict Is/Os and daily weights   - Ischemic workup (LHC) negative (see full report above)   - TTE with moderate to severe MR, however ROXANNE with no evidence of valvular disease   - Unclear etiology of HF at this time, tachycardia induced vs MR   - Will have patient complete 30 day  cardiac event monitor further evaluate for underlying rate/rhythm as possible cause for cardiomyopathy    Mitral Regurgitation    - Moderate to severe on TTE   - No evidence of MR on subsequent ROXANNE   - Will hold off on referral to [facility] valve specialist for further investigation this time   - Asymptomatic today, no murmur heard on exam     HTN   - BP at goal today 101/68   - Continue current medications   - Continue Low Na Diet and Exercise as tolerated      Tachycardia   - HR goal <70    - Continue metoprolol    - Will have patient log BP/HR at home for the next 3 weeks and call the office with readings   - Patient states that he recently took himself off of Adderall, agree with this decision   - Counseled on minimizing/avoiding stimulants such as caffeine and energy drinks, patient states that he is trying to cut back    - Will have patient complete 30 day cardiac event monitor         Complete 30 day cardiac event monitor   Log BP/HR for 2-3 weeks and call with readings  Follow up in cardiology clinic in 3 months or sooner if needed   Follow up with PCP as directed

## 2023-09-22 ENCOUNTER — OFFICE VISIT (OUTPATIENT)
Dept: CARDIOLOGY | Facility: CLINIC | Age: 48
End: 2023-09-22
Payer: MEDICAID

## 2023-09-22 VITALS
HEIGHT: 66 IN | OXYGEN SATURATION: 98 % | WEIGHT: 116.88 LBS | DIASTOLIC BLOOD PRESSURE: 68 MMHG | TEMPERATURE: 98 F | RESPIRATION RATE: 20 BRPM | HEART RATE: 72 BPM | SYSTOLIC BLOOD PRESSURE: 101 MMHG | BODY MASS INDEX: 18.79 KG/M2

## 2023-09-22 DIAGNOSIS — R00.0 TACHYCARDIA: ICD-10-CM

## 2023-09-22 DIAGNOSIS — I50.20 HFREF (HEART FAILURE WITH REDUCED EJECTION FRACTION): Primary | Chronic | ICD-10-CM

## 2023-09-22 PROCEDURE — 1111F DSCHRG MED/CURRENT MED MERGE: CPT | Mod: CPTII,,,

## 2023-09-22 PROCEDURE — 3078F DIAST BP <80 MM HG: CPT | Mod: CPTII,,,

## 2023-09-22 PROCEDURE — 1159F PR MEDICATION LIST DOCUMENTED IN MEDICAL RECORD: ICD-10-PCS | Mod: CPTII,,,

## 2023-09-22 PROCEDURE — 3008F BODY MASS INDEX DOCD: CPT | Mod: CPTII,,,

## 2023-09-22 PROCEDURE — 3044F PR MOST RECENT HEMOGLOBIN A1C LEVEL <7.0%: ICD-10-PCS | Mod: CPTII,,,

## 2023-09-22 PROCEDURE — 1160F PR REVIEW ALL MEDS BY PRESCRIBER/CLIN PHARMACIST DOCUMENTED: ICD-10-PCS | Mod: CPTII,,,

## 2023-09-22 PROCEDURE — 99204 OFFICE O/P NEW MOD 45 MIN: CPT | Mod: S$PBB,,,

## 2023-09-22 PROCEDURE — 1111F PR DISCHARGE MEDS RECONCILED W/ CURRENT OUTPATIENT MED LIST: ICD-10-PCS | Mod: CPTII,,,

## 2023-09-22 PROCEDURE — 3044F HG A1C LEVEL LT 7.0%: CPT | Mod: CPTII,,,

## 2023-09-22 PROCEDURE — 4010F PR ACE/ARB THEARPY RXD/TAKEN: ICD-10-PCS | Mod: CPTII,,,

## 2023-09-22 PROCEDURE — 1160F RVW MEDS BY RX/DR IN RCRD: CPT | Mod: CPTII,,,

## 2023-09-22 PROCEDURE — 1159F MED LIST DOCD IN RCRD: CPT | Mod: CPTII,,,

## 2023-09-22 PROCEDURE — 4010F ACE/ARB THERAPY RXD/TAKEN: CPT | Mod: CPTII,,,

## 2023-09-22 PROCEDURE — 99204 PR OFFICE/OUTPT VISIT, NEW, LEVL IV, 45-59 MIN: ICD-10-PCS | Mod: S$PBB,,,

## 2023-09-22 PROCEDURE — 99214 OFFICE O/P EST MOD 30 MIN: CPT | Mod: PBBFAC

## 2023-09-22 PROCEDURE — 3078F PR MOST RECENT DIASTOLIC BLOOD PRESSURE < 80 MM HG: ICD-10-PCS | Mod: CPTII,,,

## 2023-09-22 PROCEDURE — 3074F PR MOST RECENT SYSTOLIC BLOOD PRESSURE < 130 MM HG: ICD-10-PCS | Mod: CPTII,,,

## 2023-09-22 PROCEDURE — 3008F PR BODY MASS INDEX (BMI) DOCUMENTED: ICD-10-PCS | Mod: CPTII,,,

## 2023-09-22 PROCEDURE — 3074F SYST BP LT 130 MM HG: CPT | Mod: CPTII,,,

## 2023-09-22 NOTE — PATIENT INSTRUCTIONS
Complete 30 day cardiac event monitor   Log BP/HR for 2-3 weeks and call with readings  Follow up in cardiology clinic in 3 months or sooner if needed   Follow up with PCP as directed

## 2023-10-04 ENCOUNTER — HOSPITAL ENCOUNTER (OUTPATIENT)
Dept: CARDIOLOGY | Facility: HOSPITAL | Age: 48
Discharge: HOME | End: 2023-10-04
Payer: MEDICAID

## 2023-10-04 DIAGNOSIS — I50.20 HFREF (HEART FAILURE WITH REDUCED EJECTION FRACTION): Chronic | ICD-10-CM

## 2023-10-04 DIAGNOSIS — R00.0 TACHYCARDIA: ICD-10-CM

## 2023-10-09 LAB — FUNGUS SPEC CULT: NORMAL

## 2023-12-22 ENCOUNTER — OFFICE VISIT (OUTPATIENT)
Dept: CARDIOLOGY | Facility: CLINIC | Age: 48
End: 2023-12-22
Payer: MEDICAID

## 2023-12-22 VITALS
DIASTOLIC BLOOD PRESSURE: 79 MMHG | RESPIRATION RATE: 20 BRPM | HEART RATE: 79 BPM | SYSTOLIC BLOOD PRESSURE: 113 MMHG | TEMPERATURE: 99 F | WEIGHT: 113.13 LBS | OXYGEN SATURATION: 97 % | BODY MASS INDEX: 18.18 KG/M2 | HEIGHT: 66 IN

## 2023-12-22 DIAGNOSIS — R00.0 TACHYCARDIA: ICD-10-CM

## 2023-12-22 DIAGNOSIS — I34.0 NONRHEUMATIC MITRAL VALVE REGURGITATION: Primary | Chronic | ICD-10-CM

## 2023-12-22 DIAGNOSIS — I50.20 HFREF (HEART FAILURE WITH REDUCED EJECTION FRACTION): Chronic | ICD-10-CM

## 2023-12-22 DIAGNOSIS — I50.23 ACUTE ON CHRONIC HFREF (HEART FAILURE WITH REDUCED EJECTION FRACTION): ICD-10-CM

## 2023-12-22 PROCEDURE — 3044F PR MOST RECENT HEMOGLOBIN A1C LEVEL <7.0%: ICD-10-PCS | Mod: CPTII,,,

## 2023-12-22 PROCEDURE — 1160F RVW MEDS BY RX/DR IN RCRD: CPT | Mod: CPTII,,,

## 2023-12-22 PROCEDURE — 4010F PR ACE/ARB THEARPY RXD/TAKEN: ICD-10-PCS | Mod: CPTII,,,

## 2023-12-22 PROCEDURE — 3008F PR BODY MASS INDEX (BMI) DOCUMENTED: ICD-10-PCS | Mod: CPTII,,,

## 2023-12-22 PROCEDURE — 3078F PR MOST RECENT DIASTOLIC BLOOD PRESSURE < 80 MM HG: ICD-10-PCS | Mod: CPTII,,,

## 2023-12-22 PROCEDURE — 3078F DIAST BP <80 MM HG: CPT | Mod: CPTII,,,

## 2023-12-22 PROCEDURE — 3074F SYST BP LT 130 MM HG: CPT | Mod: CPTII,,,

## 2023-12-22 PROCEDURE — 99214 OFFICE O/P EST MOD 30 MIN: CPT | Mod: PBBFAC

## 2023-12-22 PROCEDURE — 3074F PR MOST RECENT SYSTOLIC BLOOD PRESSURE < 130 MM HG: ICD-10-PCS | Mod: CPTII,,,

## 2023-12-22 PROCEDURE — 4010F ACE/ARB THERAPY RXD/TAKEN: CPT | Mod: CPTII,,,

## 2023-12-22 PROCEDURE — 3008F BODY MASS INDEX DOCD: CPT | Mod: CPTII,,,

## 2023-12-22 PROCEDURE — 1159F MED LIST DOCD IN RCRD: CPT | Mod: CPTII,,,

## 2023-12-22 PROCEDURE — 3044F HG A1C LEVEL LT 7.0%: CPT | Mod: CPTII,,,

## 2023-12-22 PROCEDURE — 99214 OFFICE O/P EST MOD 30 MIN: CPT | Mod: S$PBB,,,

## 2023-12-22 PROCEDURE — 99214 PR OFFICE/OUTPT VISIT, EST, LEVL IV, 30-39 MIN: ICD-10-PCS | Mod: S$PBB,,,

## 2023-12-22 PROCEDURE — 1160F PR REVIEW ALL MEDS BY PRESCRIBER/CLIN PHARMACIST DOCUMENTED: ICD-10-PCS | Mod: CPTII,,,

## 2023-12-22 PROCEDURE — 1159F PR MEDICATION LIST DOCUMENTED IN MEDICAL RECORD: ICD-10-PCS | Mod: CPTII,,,

## 2023-12-22 NOTE — PROGRESS NOTES
CHIEF COMPLAINT:   Chief Complaint   Patient presents with    f/u denies chest pain or sob since LV questions on meds                                                  HPI:  Todd Bernal 48 y.o. male with no significant PMH who presents to Cardiology Clinic to establish care.  Of note, he presented to [facility] ED on 9/4 with complaints of SOB x1 week. 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 SOB started after exposure. On arrival noted to have elevated BNP and Cardiomegaly. Subsequent workup showed severely reduced EF with moderate to severe MR on TTE.  Patient underwent LHC which revealed no evidence of coronary artery disease, all coronaries were normal.  Patient also underwent ROXANNE which revealed an EF of 20-25%, no evidence of valvular disease.  Patient endorsed family history of premature CAD.  He was seen by Dr. Fonseca while inpatient.  At last office visit patient endorsed fatigue and tiredness, but attributed it to a recent discontinuation of Adderall at that time.  He stated that he had been feeling much better since he had been discharged from the hospital earlier that month.  Thirty day cardiac event monitor was ordered at last office visit to further evaluate for tachycardia induced cardiomyopathy versus MR induced.  Thirty day cardiac event monitor revealed underlying rhythm to be sinus, the patient was noted to have a 3.3 second pause.    Today the patient states that he is feeling well overall.  He denies any cardiac complaints such as chest pain, palpitations, shortness breath, PND, orthopnea, lightheadedness, dizziness, syncope, claudication symptoms, or peripheral edema.  He states that he recently restarted his Adderall and his energy levels have stabilized.  He states that he is able to complete his ADLs without any issues or ischemic symptoms.  He states that he is active in his day-to-day life.  He reports compliance with all his current medications.  He denies any tobacco or other  illicit drug use.                                                                                                                                                                                                                                                                                             CARDIAC TESTIN Day Cardiac Event Monitor 10.1.  Underlying rhythm is sinus   No symptoms reported  No patient activated events   Patient was noted to have 3.2nd pause.  Pauses greater than 3 seconds or considered significant    ROXANNE .    Left Ventricle: The left ventricle is moderately dilated. Normal wall thickness. Global hypokinesis present. There is severely reduced systolic function with a visually estimated ejection fraction of 20 - 25%.    Left Atrium: Left atrium was not well visualized.    Right Ventricle: Normal right ventricular cavity size. Systolic function is normal.    Aortic Valve: The aortic valve is a trileaflet valve.    Echo .3.23    Left Ventricle: The left ventricle is moderately dilated. There is severely reduced systolic function with a visually estimated ejection fraction of 15 - 20%.    Left Atrium: Left atrium is dilated. LA volume index is 62ml/m2.    Right Ventricle: Normal right ventricular cavity size. Systolic function is normal.    Right Atrium: Right atrium is mildly dilated.    Aortic Valve: The aortic valve is a trileaflet valve.    Mitral Valve: There is moderate to severe regurgitation    Tricuspid Valve: There is mild to moderate regurgitation.    Pulmonary Artery: The estimated pulmonary artery systolic pressure is 50 mmHg.    IVC/SVC: Normal venous pressure at 3 mmHg.    Pericardium: There is a small effusion. Bilateral pleural effusions.    Cardiac catheterization 9.3.23    Conclusion    The pre-procedure left ventricular end diastolic pressure was 20.    The estimated blood loss was <50 mL.    The coronary arteries were normal..    FINDINGS  LVEDP:  20  mmHg  Left Main:  No stenosis  LAD:  No stenosis  Circumflex:  No stenosis  RCA:  No stenosis  The patient has No CAD  Blood loss:  less than 10 cc.    RECOMMENDATIONS  Risk factor modifications  Activity -- avoid straining with affected limb for one week  Exercise -- as tolerated  Precautions post D/C -- come to ER for hematoma, unusual pain, erythema, or unusual drainage at access site  Precautions post D/C -- if develop bleeding or hematoma at access site, hold pressure at access site, and come to ER    POST-CATH GUIDELINE FOR INPATIENT DISCHARGE  No hematoma or swelling at access site  No unusual tenderness at access site  Adequate distal pulse or capillary refill in limb(s) accessed  No unusual difficulty with ambulation after bed rest is over        Patient Active Problem List   Diagnosis    Nonrheumatic mitral valve regurgitation    HFrEF (heart failure with reduced ejection fraction)    Acute on chronic HFrEF (heart failure with reduced ejection fraction)    Tachycardia     Past Surgical History:   Procedure Laterality Date    ANGIOGRAM, CORONARY, WITH LEFT HEART CATHETERIZATION N/A 9/6/2023    Procedure: Angiogram, Coronary, with Left Heart Cath;  Surgeon: Lexa Fonseca MD;  Location: [facility];  Service: Cardiology;  Laterality: N/A;     Social History     Socioeconomic History    Marital status: Unknown   Tobacco Use    Smoking status: Never    Smokeless tobacco: Never   Substance and Sexual Activity    Alcohol use: Never    Drug use: Never     Social Determinants of Health     Financial Resource Strain: Low Risk  (9/5/2023)    Overall Financial Resource Strain (CARDIA)     Difficulty of Paying Living Expenses: Not hard at all   Food Insecurity: No Food Insecurity (9/5/2023)    Hunger Vital Sign     Worried About Running Out of Food in the Last Year: Never true     Ran Out of Food in the Last Year: Never true   Transportation Needs: No Transportation Needs (9/5/2023)    PRAPARE - Transportation      Lack of Transportation (Medical): No     Lack of Transportation (Non-Medical): No   Physical Activity: Inactive (9/5/2023)    Exercise Vital Sign     Days of Exercise per Week: 0 days     Minutes of Exercise per Session: 0 min   Stress: No Stress Concern Present (9/5/2023)    [employer] [place] [employer] - Occupational Stress Questionnaire     Feeling of Stress : Not at all   Social Connections: Socially Isolated (9/5/2023)    Social Connection and Isolation Panel [NHANES]     Frequency of Communication with Friends and Family: More than three times a week     Frequency of Social Gatherings with Friends and Family: More than three times a week     Attends Religion Services: Never     Active Member of Clubs or Organizations: No     Attends Club or Organization Meetings: Never     Marital Status: Never    Housing Stability: Low Risk  (9/5/2023)    Housing Stability Vital Sign     Unable to Pay for Housing in the Last Year: No     Number of Places Lived in the Last Year: 1     Unstable Housing in the Last Year: No        History reviewed. No pertinent family history.  Review of patient's allergies indicates:   Allergen Reactions    Phenergan [promethazine]          ROS:  Review of Systems   Constitutional:  Negative for malaise/fatigue.   HENT: Negative.     Eyes: Negative.    Respiratory: Negative.  Negative for shortness of breath.    Cardiovascular: Negative.  Negative for chest pain, palpitations, orthopnea, claudication, leg swelling and PND.   Gastrointestinal: Negative.    Genitourinary: Negative.    Musculoskeletal: Negative.    Skin: Negative.    Neurological: Negative.  Negative for dizziness and weakness.   Endo/Heme/Allergies: Negative.    Psychiatric/Behavioral:  The patient is nervous/anxious.                                                                                                                                                                                 Negative except  "as stated in the history of present illness. See HPI for details.    PHYSICAL EXAM:  Visit Vitals  /79 (BP Location: Left arm, Patient Position: Sitting, BP Method: Medium (Automatic))   Pulse 79   Temp 98.6 °F (37 °C) (Oral)   Resp 20   Ht 5' 6" (1.676 m)   Wt 51.3 kg (113 lb 1.5 oz)   SpO2 97%   BMI 18.25 kg/m²       Physical Exam  Constitutional:       Appearance: Normal appearance.   HENT:      Head: Normocephalic.   Eyes:      Extraocular Movements: Extraocular movements intact.   Neck:      Vascular: No carotid bruit.   Cardiovascular:      Rate and Rhythm: Normal rate and regular rhythm.      Pulses: Normal pulses.      Heart sounds: Normal heart sounds.   Pulmonary:      Effort: Pulmonary effort is normal.      Breath sounds: Normal breath sounds.   Abdominal:      General: There is no distension.   Musculoskeletal:         General: Normal range of motion.      Right lower leg: No edema.      Left lower leg: No edema.   Skin:     General: Skin is warm and dry.   Neurological:      General: No focal deficit present.      Mental Status: He is alert and oriented to person, place, and time.      Motor: No weakness.   Psychiatric:         Mood and Affect: Mood normal.         Behavior: Behavior normal.         Current Outpatient Medications   Medication Instructions    empagliflozin (JARDIANCE) 10 mg, Oral, Daily    furosemide (LASIX) 20 mg, Oral, Daily    metoprolol succinate (TOPROL-XL) 100 mg, Oral, Daily    sacubitriL-valsartan (ENTRESTO) 24-26 mg per tablet 1 tablet, Oral, 2 times daily    spironolactone (ALDACTONE) 25 mg, Oral, Daily    venlafaxine (EFFEXOR-XR) 75 mg, Oral        All medications, laboratory studies, cardiac diagnostic imaging reviewed.     Lab Results   Component Value Date    LDL 88.00 09/04/2023    TRIG 80 09/04/2023    CREATININE 0.72 (L) 09/08/2023    MG 2.20 09/08/2023    K 4.3 09/08/2023        ASSESSMENT/PLAN:     New-Onset Heart Failure with Reduced Ejection Fraction    - " Asymptomatic today (see HPI)    - Etiology: undetermined   - HF Status: NYHA Class II-III, ACC/AHA Stage C   - LVEF: 15-20% (TTE on 9.5.23)    - LVEF: 20-25% (ROXANNE on 9.8.23)   - Continue Entresto, Spironolactone, Metroprolol, and Jardiance - reports compliance with all medications in his tolerating well   - Continue Lasix PRN   - Strict Is/Os and daily weights   - Ischemic workup (LHC) negative (see full report above)   - TTE with moderate to severe MR, however ROXANNE with no evidence of valvular disease   - 30 day cardiac event monitor was ordered to evaluate for underlying rate/rhythm as possible cars for cardiomyopathy.  Thirty day cardiac event monitor did reveal a normal sinus rhythm as the predominant rhythm, however patient was noted to have several pauses with the longest pause being 3.3 seconds long.  - Given 30 day event monitor findings, patient is a candidate for PPM.  Discussed case with staff cardiologist.  Given that patient is continuing to use Adderall, Dr. Fonseca will not do pacemaker insertion, unless patient discontinues his Adderall use.  At this time, patient is unwilling to stop taking Adderall and would like 2nd opinion.  Will refer patient to EP in [place] so that patient may receive 2nd opinion and possibly undergo pacemaker insertion over there.  - Strict ED precautions given     Mitral Regurgitation    - Stable from last appointment, denies symptoms     - Moderate to severe on TTE   - No evidence of MR on subsequent ROXANNE   - Will hold off on referral to [facility] valve specialist for further investigation this time   - No murmur appreciated on exam     HTN   - BP at goal today 113/79   - Continue current medications   - Continue Low Na Diet and Exercise as tolerated      Tachycardia   - Tachycardia Resolved - heart rate 79 today    - Continue metoprolol; despite pauses on event monitor, we will keep patient on beta-blocker for now given that patient has significant tachycardia historically.   "Heart rate today is 79 BPM on Metoprolol Succinate 100 MG. Discussed case with Dr. Fonseca.    - Patient logs HR regularly at home    - Counseled on minimizing/avoiding stimulants such as caffeine and energy drinks, patient states that he is trying to cut back    - 30 day event monitor revealed underlying rhythm to be sinus, significant tachycardia was not observed, however there was several pauses with the longest pause being 3.3 seconds long    - Given the episodes of pauses, and one specifically exceeding 3 seconds, patient qualifies for ppm.  Discussed case with staff cardiologist.  Staff cardiologist at [facility] will not perform the procedure if patient is still taking Adderall.  Patient understands and would like to continue with his medication at this time.  Will refer patient to EP in [place] for 2nd opinion and for possible pacemaker placement at their facility     - Strongly encouraged patient to seek advice/assistance from PCP to discuss alternative medications for Adderall.  Patient states that Adderall is "the only one that works for him" and that he has not interested in switching medications at this time.  We again discussed the potential risks of the medication, specifically the risk for electrical abnormalities causing arrhythmias, patient voiced understanding.  Ultimately patient decided that he will remain on Adderall at this time.        Will refer to EP in [place] for PPM   Follow up in general cardiology clinic in 2 months or sooner if needed  Follow up with PCP as directed   Strict ED precautions given  "

## 2023-12-22 NOTE — PATIENT INSTRUCTIONS
Will refer to EP in [place] for PPM   Follow up in general cardiology clinic in 2 months or sooner if needed  Follow up with PCP as directed   Strict ED precautions given

## 2024-02-22 ENCOUNTER — OFFICE VISIT (OUTPATIENT)
Dept: CARDIOLOGY | Facility: CLINIC | Age: 49
End: 2024-02-22
Payer: MEDICAID

## 2024-02-22 VITALS
HEART RATE: 78 BPM | HEIGHT: 66 IN | OXYGEN SATURATION: 100 % | SYSTOLIC BLOOD PRESSURE: 99 MMHG | DIASTOLIC BLOOD PRESSURE: 62 MMHG | RESPIRATION RATE: 20 BRPM | WEIGHT: 114 LBS | BODY MASS INDEX: 18.32 KG/M2 | TEMPERATURE: 98 F

## 2024-02-22 DIAGNOSIS — I34.0 NONRHEUMATIC MITRAL VALVE REGURGITATION: ICD-10-CM

## 2024-02-22 DIAGNOSIS — Z76.0 MEDICATION REFILL: ICD-10-CM

## 2024-02-22 DIAGNOSIS — I50.20 HFREF (HEART FAILURE WITH REDUCED EJECTION FRACTION): Primary | ICD-10-CM

## 2024-02-22 DIAGNOSIS — F15.20 STIMULANT DEPENDENCE: ICD-10-CM

## 2024-02-22 DIAGNOSIS — R00.0 TACHYCARDIA: ICD-10-CM

## 2024-02-22 PROCEDURE — 99214 OFFICE O/P EST MOD 30 MIN: CPT | Mod: PBBFAC | Performed by: INTERNAL MEDICINE

## 2024-02-22 RX ORDER — DEXTROAMPHETAMINE SACCHARATE, AMPHETAMINE ASPARTATE MONOHYDRATE, DEXTROAMPHETAMINE SULFATE AND AMPHETAMINE SULFATE 1.25; 1.25; 1.25; 1.25 MG/1; MG/1; MG/1; MG/1
5 CAPSULE, EXTENDED RELEASE ORAL 3 TIMES DAILY
COMMUNITY

## 2024-02-22 RX ORDER — SPIRONOLACTONE 25 MG/1
25 TABLET ORAL DAILY
Qty: 90 TABLET | Refills: 3 | Status: SHIPPED | OUTPATIENT
Start: 2024-02-22 | End: 2024-05-16

## 2024-02-22 RX ORDER — FUROSEMIDE 20 MG/1
20 TABLET ORAL DAILY
Qty: 90 TABLET | Refills: 3 | Status: SHIPPED | OUTPATIENT
Start: 2024-02-22 | End: 2024-05-16 | Stop reason: SDUPTHER

## 2024-02-22 RX ORDER — METOPROLOL SUCCINATE 100 MG/1
100 TABLET, EXTENDED RELEASE ORAL DAILY
Qty: 30 TABLET | Refills: 11 | Status: SHIPPED | OUTPATIENT
Start: 2024-02-22 | End: 2024-05-16 | Stop reason: SDUPTHER

## 2024-02-22 NOTE — PROGRESS NOTES
CHIEF COMPLAINT:   Chief Complaint   Patient presents with    f/u denies chest pain or sob since LV questions on N O refe                                                  HPI:  Todd Bernal 49 y.o. male with no significant PMH who presents to Cardiology Clinic to establish care.  Of note, he presented to [facility] ED on 9/4 with complaints of SOB x1 week. 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 SOB started after exposure. On arrival noted to have elevated BNP and Cardiomegaly. Subsequent workup showed severely reduced EF with moderate to severe MR on TTE.  Patient underwent LHC which revealed no evidence of coronary artery disease, all coronaries were normal.  Patient also underwent ROXANNE which revealed an EF of 20-25%, no evidence of valvular disease.  Patient endorsed family history of premature CAD.  He was seen by Dr. Fonseca while inpatient.  At last office visit patient endorsed fatigue and tiredness, but attributed it to a recent discontinuation of Adderall at that time.  He stated that he had been feeling much better since he had been discharged from the hospital earlier that month.  Thirty day cardiac event monitor was ordered at last office visit to further evaluate for tachycardia induced cardiomyopathy versus MR induced.  Thirty day cardiac event monitor revealed underlying rhythm to be sinus, the patient was noted to have a 3.3 second pause.    Patient was last evaluated in clinic on 12/22/23. Was referred to EP in [place] for possible pacemaker placement for significant tachycardia on 30 day event monitor at last clinic appointment with 2 month follow-up. Has not received correspondence to set up initial appointment yet. Today the patient states that he is feeling well overall.  He denies any cardiac complaints such as chest pain, palpitations, shortness breath, PND, orthopnea, lightheadedness, dizziness, syncope, claudication symptoms, or peripheral edema.  He reports he is following heart  healthy low Na fluid restricted diet and has significantly decreased caffeine intake. He used to drink 4-5 energy drinks per day and coffee continuously. Patient still reports taking Aderrall XR 5mg TID, still not interested in discontinuing.  He states that he is able to complete his ADLs without any issues or ischemic symptoms.  He states that he is active in his day-to-day life.  He reports compliance with all his current medications without missed doses and monitors vitals regularly at home.  He denies any tobacco or other illicit drug use.                                                                                                                                                                                                                                                                                             CARDIAC TESTIN Day Cardiac Event Monitor 10.-10.31.23  Underlying rhythm is sinus   No symptoms reported  No patient activated events   Patient was noted to have 3.2nd pause.  Pauses greater than 3 seconds or considered significant    ROXANNE .    Left Ventricle: The left ventricle is moderately dilated. Normal wall thickness. Global hypokinesis present. There is severely reduced systolic function with a visually estimated ejection fraction of 20 - 25%.    Left Atrium: Left atrium was not well visualized.    Right Ventricle: Normal right ventricular cavity size. Systolic function is normal.    Aortic Valve: The aortic valve is a trileaflet valve.    Echo 9.3.23    Left Ventricle: The left ventricle is moderately dilated. There is severely reduced systolic function with a visually estimated ejection fraction of 15 - 20%.    Left Atrium: Left atrium is dilated. LA volume index is 62ml/m2.    Right Ventricle: Normal right ventricular cavity size. Systolic function is normal.    Right Atrium: Right atrium is mildly dilated.    Aortic Valve: The aortic valve is a trileaflet valve.     Mitral Valve: There is moderate to severe regurgitation    Tricuspid Valve: There is mild to moderate regurgitation.    Pulmonary Artery: The estimated pulmonary artery systolic pressure is 50 mmHg.    IVC/SVC: Normal venous pressure at 3 mmHg.    Pericardium: There is a small effusion. Bilateral pleural effusions.    Cardiac catheterization 9.3.23    Conclusion    The pre-procedure left ventricular end diastolic pressure was 20.    The estimated blood loss was <50 mL.    The coronary arteries were normal..    FINDINGS  LVEDP:  20 mmHg  Left Main:  No stenosis  LAD:  No stenosis  Circumflex:  No stenosis  RCA:  No stenosis  The patient has No CAD  Blood loss:  less than 10 cc.    RECOMMENDATIONS  Risk factor modifications  Activity -- avoid straining with affected limb for one week  Exercise -- as tolerated  Precautions post D/C -- come to ER for hematoma, unusual pain, erythema, or unusual drainage at access site  Precautions post D/C -- if develop bleeding or hematoma at access site, hold pressure at access site, and come to ER    POST-CATH GUIDELINE FOR INPATIENT DISCHARGE  No hematoma or swelling at access site  No unusual tenderness at access site  Adequate distal pulse or capillary refill in limb(s) accessed  No unusual difficulty with ambulation after bed rest is over        Patient Active Problem List   Diagnosis    Nonrheumatic mitral valve regurgitation    HFrEF (heart failure with reduced ejection fraction)    Acute on chronic HFrEF (heart failure with reduced ejection fraction)    Tachycardia     Past Surgical History:   Procedure Laterality Date    ANGIOGRAM, CORONARY, WITH LEFT HEART CATHETERIZATION N/A 09/06/2023    Procedure: Angiogram, Coronary, with Left Heart Cath;  Surgeon: Lexa Fonseca MD;  Location: [facility] CATH LAB;  Service: Cardiology;  Laterality: N/A;    CARDIAC CATHETERIZATION       Social History     Socioeconomic History    Marital status: Unknown   Tobacco Use    Smoking status:  Never    Smokeless tobacco: Never   Substance and Sexual Activity    Alcohol use: Never    Drug use: Never     Social Determinants of Health     Financial Resource Strain: Low Risk  (9/5/2023)    Overall Financial Resource Strain (CARDIA)     Difficulty of Paying Living Expenses: Not hard at all   Food Insecurity: No Food Insecurity (9/5/2023)    Hunger Vital Sign     Worried About Running Out of Food in the Last Year: Never true     Ran Out of Food in the Last Year: Never true   Transportation Needs: No Transportation Needs (9/5/2023)    PRAPARE - Transportation     Lack of Transportation (Medical): No     Lack of Transportation (Non-Medical): No   Physical Activity: Inactive (9/5/2023)    Exercise Vital Sign     Days of Exercise per Week: 0 days     Minutes of Exercise per Session: 0 min   Stress: No Stress Concern Present (9/5/2023)    [employer] [place] [employer] - Occupational Stress Questionnaire     Feeling of Stress : Not at all   Social Connections: Socially Isolated (9/5/2023)    Social Connection and Isolation Panel [NHANES]     Frequency of Communication with Friends and Family: More than three times a week     Frequency of Social Gatherings with Friends and Family: More than three times a week     Attends Sikhism Services: Never     Active Member of Clubs or Organizations: No     Attends Club or Organization Meetings: Never     Marital Status: Never    Housing Stability: Low Risk  (9/5/2023)    Housing Stability Vital Sign     Unable to Pay for Housing in the Last Year: No     Number of Places Lived in the Last Year: 1     Unstable Housing in the Last Year: No        Family History   Problem Relation Age of Onset    Hyperlipidemia Father     Heart disease Father      Review of patient's allergies indicates:   Allergen Reactions    Phenergan [promethazine]          ROS:  Review of Systems   Constitutional:  Negative for diaphoresis and malaise/fatigue.   HENT: Negative.     Eyes:  "Negative.  Negative for blurred vision and double vision.   Respiratory: Negative.  Negative for cough, sputum production and shortness of breath.    Cardiovascular: Negative.  Negative for chest pain, palpitations, orthopnea, claudication, leg swelling and PND.   Gastrointestinal: Negative.  Negative for abdominal pain.   Genitourinary: Negative.    Musculoskeletal: Negative.  Negative for falls.   Skin: Negative.    Neurological: Negative.  Negative for dizziness, loss of consciousness, weakness and headaches.   Endo/Heme/Allergies: Negative.    Psychiatric/Behavioral:  The patient is not nervous/anxious.                                                                                                                                                                                 Negative except as stated in the history of present illness. See HPI for details.    PHYSICAL EXAM:  Visit Vitals  BP 99/62 (BP Location: Left arm, Patient Position: Sitting, BP Method: Medium (Automatic))   Pulse 78   Temp 98.2 °F (36.8 °C) (Oral)   Resp 20   Ht 5' 6" (1.676 m)   Wt 51.7 kg (114 lb)   SpO2 100%   BMI 18.40 kg/m²       Physical Exam  Constitutional:       Appearance: Normal appearance.   HENT:      Head: Normocephalic.   Eyes:      Extraocular Movements: Extraocular movements intact.   Neck:      Vascular: No carotid bruit.   Cardiovascular:      Rate and Rhythm: Normal rate and regular rhythm.      Pulses: Normal pulses.      Heart sounds: Normal heart sounds.   Pulmonary:      Effort: Pulmonary effort is normal.      Breath sounds: Normal breath sounds.   Abdominal:      General: There is no distension.   Musculoskeletal:         General: Normal range of motion.      Right lower leg: No edema.      Left lower leg: No edema.   Skin:     General: Skin is warm and dry.   Neurological:      General: No focal deficit present.      Mental Status: He is alert and oriented to person, place, and time.      Motor: No weakness. "   Psychiatric:         Mood and Affect: Mood normal.         Behavior: Behavior normal.         Current Outpatient Medications   Medication Instructions    dextroamphetamine-amphetamine (ADDERALL XR) 5 MG 24 hr capsule 5 mg, Oral, 3 times daily    empagliflozin (JARDIANCE) 10 mg, Oral, Daily    furosemide (LASIX) 20 mg, Oral, Daily    metoprolol succinate (TOPROL-XL) 100 mg, Oral, Daily    sacubitriL-valsartan (ENTRESTO) 24-26 mg per tablet 1 tablet, Oral, 2 times daily    spironolactone (ALDACTONE) 25 mg, Oral, Daily    venlafaxine (EFFEXOR-XR) 75 mg, Oral        All medications, laboratory studies, cardiac diagnostic imaging reviewed.     Lab Results   Component Value Date    LDL 88.00 09/04/2023    TRIG 80 09/04/2023    CREATININE 0.72 (L) 09/08/2023    MG 2.20 09/08/2023    K 4.3 09/08/2023        ASSESSMENT/PLAN:     New-Onset Heart Failure with Reduced Ejection Fraction   Medication Refill   - Asymptomatic today (see HPI)    - Etiology: undetermined   - HF Status: NYHA Class II-III, ACC/AHA Stage C   - LVEF: 15-20% (TTE on 9.5.23)    - LVEF: 20-25% (ROXANNE on 9.8.23)  - Ordered repeat Echo today to evaluate for EF recovery and need for pacemaker vs. ICD placement    - Continue Entresto, Spironolactone, Metroprolol, and Jardiance - reports compliance with all medications in his tolerating well   - Continue Lasix PRN   - Strict Is/Os and daily weights   - Ischemic workup (C) negative (see full report above)   - 30 day cardiac event monitor was ordered to evaluate for underlying rate/rhythm as possible cars for cardiomyopathy.  Thirty day cardiac event monitor did reveal a normal sinus rhythm as the predominant rhythm, however patient was noted to have several pauses with the longest pause being 3.3 seconds long.  - Given 30 day event monitor findings, patient is a candidate for PPM previously. Will wait for repeat Echo to contact [facility] for PPM vs ICD placement.   - Strict ED precautions given     Mitral  "Regurgitation    - Stable from last appointment, denies symptoms     - Moderate to severe on TTE   - No evidence of MR on subsequent ROXANNE   - Will hold off on referral to U valve specialist for further investigation this time   - No murmur appreciated on exam     HTN   - BP at goal today 99/62 HR 78   - Continue current medications   - Continue Low Na Diet, 1.5L fluid restriction and Exercise as tolerated      Tachycardia   - Tachycardia Resolved - heart rate 78 today    - Continue metoprolol; despite pauses on event monitor, we will keep patient on beta-blocker for now given that patient has significant tachycardia historically.  Heart rate today is 78 BPM on Metoprolol Succinate 100 MG.    - Patient logs HR regularly at home    - Counseled on minimizing/avoiding stimulants such as caffeine and energy drinks   - Strongly encouraged patient to seek advice/assistance from PCP to discuss alternative medications for Adderall.  Patient states that Adderall is "the only one that works for him" and that he has not interested in switching medications at this time.  We again discussed the potential risks of the medication, specifically the risk for electrical abnormalities causing arrhythmias, patient voiced understanding.  Ultimately patient decided that he will remain on Adderall at this time.        Repeat Echo in 1-2 months   Follow up in general cardiology clinic in 4 months or sooner if Echo reports EF<35%   Follow up with PCP as directed   Strict ED precautions given      Patient case and plan of care discussed with Dr. Jessica Orozco.       Lg Hernandez MD   [facility] HO-1     "

## 2024-02-22 NOTE — LETTER
February 22, 2024          No Recipients             [facility]  [address] [address]  Phone: [phone]   Patient: Todd Bernal   MR Number: [id number]   YOB: 1975   Date of Visit: 2/22/2024       Dear Dr. Dumont Recipients:    Thank you for referring Todd Bernal to me for evaluation. Attached you will find relevant portions of my assessment and plan of care.    If you have questions, please do not hesitate to call me. I look forward to following Todd Bernal along with you.    Sincerely,      Jessica Orozco MD            CC    No Recipients    Enclosure

## 2024-02-22 NOTE — PATIENT INSTRUCTIONS
Follow up in 4 months or sooner if needed  Echocardiogram to be scheduled in 1-2 months, call number on  handout provided to schedule testing.

## 2024-02-28 ENCOUNTER — OFFICE VISIT (OUTPATIENT)
Dept: INTERNAL MEDICINE | Facility: CLINIC | Age: 49
End: 2024-02-28
Payer: MEDICAID

## 2024-02-28 VITALS
RESPIRATION RATE: 18 BRPM | TEMPERATURE: 98 F | OXYGEN SATURATION: 98 % | DIASTOLIC BLOOD PRESSURE: 65 MMHG | BODY MASS INDEX: 18.84 KG/M2 | SYSTOLIC BLOOD PRESSURE: 104 MMHG | WEIGHT: 117.19 LBS | HEIGHT: 66 IN | HEART RATE: 70 BPM

## 2024-02-28 DIAGNOSIS — N63.42 SUBAREOLAR MASS OF LEFT BREAST: ICD-10-CM

## 2024-02-28 DIAGNOSIS — I50.20 HFREF (HEART FAILURE WITH REDUCED EJECTION FRACTION): Primary | ICD-10-CM

## 2024-02-28 DIAGNOSIS — Z11.59 NEED FOR HEPATITIS C SCREENING TEST: ICD-10-CM

## 2024-02-28 PROCEDURE — 99214 OFFICE O/P EST MOD 30 MIN: CPT | Mod: PBBFAC

## 2024-02-28 NOTE — PROGRESS NOTES
"[facility] Visit    Chief Complaint:      Follow-up, Medication Refill, and Lumps (To the bottom left foot and right chest.)     Subjective:     HPI:  Todd Bernal is a 49 y.o. male with PMH of HFrEF (EF 20-25%).  He presents to clinic today for follow-up for his admission with new onset CHF.     He explained pretty well his GDMT regimen, limiting his water intake to 1500 mL, reducing salt intake, monitoring his blood pressure, weight, and leg swelling.  He does not complain of SOB, orthopnea, leg swelling, or chest pain. No nausea, vomiting, diarrhea, constipation.  ROXANNE was done on discharge, EF 20-25%.  Has new lumps on his chest and foot, have grown in size significantly, non-tender.      Review of Systems  A comprehensive 12 point review of systems was completed.  Please see above for pertinent positives and negatives.     Objective:   Last 24 Hour Vital Signs:  Vitals  BP: 104/65  Temp: 97.6 °F (36.4 °C)  Pulse: 70  Resp: 18  SpO2: 98 %  Height: 5' 6" (167.6 cm)  Weight: 53.2 kg (117 lb 3.2 oz)    Physical Examination:  General: Awake, alert, & oriented to person, place & time. No acute distress  Psychiatric: Mood and affect normal  HEENT: Normocephalic, atraumatic. Face symmetric.  Cardiovascular: Regular rate & rhythm. Normal S1 & S2 w/out murmurs, rubs or gallops.  Pulmonary: Bilateral symmetric chest rise. Non-labored, no wheezes, rhonchi or crackles are appreciated.  Abdominal:  Nondistended. Bowel sounds present  Extremities: No clubbing, cyanosis or edema.  Skin:  Exposed skin is warm & dry.  Neuro:   Patient moves all extremities equally. Sensation intact bilateraly.    Labs  @[facility]@, @[facility]@     Assessment & Plan:     New CHF (EF 20-25%)  Continue Jardiance 10 mg Daily, Lasix 20 mg daily, Toprol 100 mg daily, Aldactone 25 mg daily, and Entresto 24-26 BID  restrict water intake to 1500 mL a day, slat intake no more than 2000 mg per day  Repeating TTE  Cardiology is " considering AICD based on TTE reading  f/u with cardiology    Breast Mass  Showed up a month ago and grew in size significantly  Subareolar, rubbery, non tender, approximately 1 inch in diameter  No lymphadenopathy  Due to location, will order a mammogram and refer to surgery for biopsy    Foot Mass  On the right sole  Rubbery, nontender  Most likely a lipoma  Patient doesn't feels it when he walk and it doesn't bother him  No need for intervention at this point        Health Maintenance/ Wellness  Pneumococcal vaccine: not descussed  TDAP: not descussed  Influenza vaccine: not descussed  Shingrix vaccine: NA  AAA U/S screening: NA  Screening colonoscopy: will descuss next visit  Lung Cancer Screening: NA  Hepatitis Panel: ordered  HIV - nonreactive      Labs ordered: CBC,CMP, lipid panel, hepatitis panel  Imaging: TTE ordered  Medications: reconciled, discussed and refills given.    Follow-ups  -Follow-up medicine clinic in 6 m      Yu Aguilera MD  Internal Medicine - PGY-1

## 2024-03-28 ENCOUNTER — HOSPITAL ENCOUNTER (OUTPATIENT)
Dept: CARDIOLOGY | Facility: HOSPITAL | Age: 49
Discharge: HOME | End: 2024-03-28
Payer: MEDICAID

## 2024-03-28 VITALS
BODY MASS INDEX: 18.8 KG/M2 | DIASTOLIC BLOOD PRESSURE: 65 MMHG | WEIGHT: 117 LBS | SYSTOLIC BLOOD PRESSURE: 110 MMHG | HEIGHT: 66 IN

## 2024-03-28 DIAGNOSIS — I50.20 HFREF (HEART FAILURE WITH REDUCED EJECTION FRACTION): ICD-10-CM

## 2024-03-28 DIAGNOSIS — I34.0 NONRHEUMATIC MITRAL VALVE REGURGITATION: ICD-10-CM

## 2024-03-28 DIAGNOSIS — R00.0 TACHYCARDIA: ICD-10-CM

## 2024-03-28 LAB
AV INDEX (PROSTH): 0.86
AV MEAN GRADIENT: 2 MMHG
AV PEAK GRADIENT: 4 MMHG
AV VALVE AREA BY VELOCITY RATIO: 2.73 CM²
AV VALVE AREA: 2.73 CM²
AV VELOCITY RATIO: 0.86
BSA FOR ECHO PROCEDURE: 1.57 M2
CV ECHO LV RWT: 0.4 CM
DOP CALC AO PEAK VEL: 1.01 M/S
DOP CALC AO VTI: 18.8 CM
DOP CALC LVOT AREA: 3.2 CM2
DOP CALC LVOT DIAMETER: 2.01 CM
DOP CALC LVOT PEAK VEL: 0.87 M/S
DOP CALC LVOT STROKE VOLUME: 51.38 CM3
DOP CALC MV VTI: 18.8 CM
DOP CALCLVOT PEAK VEL VTI: 16.2 CM
E WAVE DECELERATION TIME: 225.54 MSEC
E/A RATIO: 1.1
ECHO LV POSTERIOR WALL: 0.91 CM (ref 0.6–1.1)
FRACTIONAL SHORTENING: 26 % (ref 28–44)
HR MV ECHO: 72 BPM
INTERVENTRICULAR SEPTUM: 0.97 CM (ref 0.6–1.1)
IVC DIAMETER: 1.6 CM
LEFT ATRIUM SIZE: 2.56 CM
LEFT ATRIUM VOLUME INDEX MOD: 11.6 ML/M2
LEFT ATRIUM VOLUME MOD: 18.43 CM3
LEFT INTERNAL DIMENSION IN SYSTOLE: 3.35 CM (ref 2.1–4)
LEFT VENTRICLE DIASTOLIC VOLUME INDEX: 58.58 ML/M2
LEFT VENTRICLE DIASTOLIC VOLUME: 93.15 ML
LEFT VENTRICLE MASS INDEX: 89 G/M2
LEFT VENTRICLE SYSTOLIC VOLUME INDEX: 28.9 ML/M2
LEFT VENTRICLE SYSTOLIC VOLUME: 45.88 ML
LEFT VENTRICULAR INTERNAL DIMENSION IN DIASTOLE: 4.51 CM (ref 3.5–6)
LEFT VENTRICULAR MASS: 141.36 G
LV LATERAL E/E' RATIO: 4.4 M/S
LVOT MG: 1.5 MMHG
LVOT MV: 0.57 CM/S
MV MEAN GRADIENT: 1 MMHG
MV PEAK A VEL: 0.4 M/S
MV PEAK E VEL: 0.44 M/S
MV PEAK GRADIENT: 2 MMHG
MV VALVE AREA BY CONTINUITY EQUATION: 2.73 CM2
OHS LV EJECTION FRACTION SIMPSONS BIPLANE MOD: 53 %
PISA TR MAX VEL: 2.33 M/S
RA MAJOR: 4.18 CM
RA PRESSURE ESTIMATED: 3 MMHG
RV TB RVSP: 5 MMHG
TDI LATERAL: 0.1 M/S
TR MAX PG: 22 MMHG
TRICUSPID ANNULAR PLANE SYSTOLIC EXCURSION: 2.19 CM
TV REST PULMONARY ARTERY PRESSURE: 25 MMHG
Z-SCORE OF LEFT VENTRICULAR DIMENSION IN END DIASTOLE: -0.06
Z-SCORE OF LEFT VENTRICULAR DIMENSION IN END SYSTOLE: 1.37

## 2024-03-28 PROCEDURE — 93306 TTE W/DOPPLER COMPLETE: CPT

## 2024-05-01 ENCOUNTER — HOSPITAL ENCOUNTER (OUTPATIENT)
Dept: RADIOLOGY | Facility: HOSPITAL | Age: 49
Discharge: HOME | End: 2024-05-01
Payer: MEDICAID

## 2024-05-01 DIAGNOSIS — N63.41 SUBAREOLAR MASS OF RIGHT BREAST: ICD-10-CM

## 2024-05-01 PROCEDURE — 77066 DX MAMMO INCL CAD BI: CPT | Mod: 26,,, | Performed by: RADIOLOGY

## 2024-05-01 PROCEDURE — 76642 ULTRASOUND BREAST LIMITED: CPT | Mod: 26,RT,, | Performed by: RADIOLOGY

## 2024-05-01 PROCEDURE — 77066 DX MAMMO INCL CAD BI: CPT | Mod: TC

## 2024-05-01 PROCEDURE — 77062 BREAST TOMOSYNTHESIS BI: CPT | Mod: 26,,, | Performed by: RADIOLOGY

## 2024-05-01 PROCEDURE — 76642 ULTRASOUND BREAST LIMITED: CPT | Mod: TC,RT

## 2024-05-09 ENCOUNTER — OFFICE VISIT (OUTPATIENT)
Dept: SURGERY | Facility: CLINIC | Age: 49
End: 2024-05-09
Payer: MEDICAID

## 2024-05-09 VITALS
SYSTOLIC BLOOD PRESSURE: 105 MMHG | HEIGHT: 66 IN | HEART RATE: 69 BPM | BODY MASS INDEX: 17.94 KG/M2 | RESPIRATION RATE: 18 BRPM | WEIGHT: 111.63 LBS | OXYGEN SATURATION: 97 % | DIASTOLIC BLOOD PRESSURE: 75 MMHG

## 2024-05-09 DIAGNOSIS — N62 GYNECOMASTIA, MALE: Primary | ICD-10-CM

## 2024-05-09 PROCEDURE — 3074F SYST BP LT 130 MM HG: CPT | Mod: CPTII,,, | Performed by: STUDENT IN AN ORGANIZED HEALTH CARE EDUCATION/TRAINING PROGRAM

## 2024-05-09 PROCEDURE — 1159F MED LIST DOCD IN RCRD: CPT | Mod: CPTII,,, | Performed by: STUDENT IN AN ORGANIZED HEALTH CARE EDUCATION/TRAINING PROGRAM

## 2024-05-09 PROCEDURE — 3008F BODY MASS INDEX DOCD: CPT | Mod: CPTII,,, | Performed by: STUDENT IN AN ORGANIZED HEALTH CARE EDUCATION/TRAINING PROGRAM

## 2024-05-09 PROCEDURE — 99203 OFFICE O/P NEW LOW 30 MIN: CPT | Mod: S$PBB,,, | Performed by: STUDENT IN AN ORGANIZED HEALTH CARE EDUCATION/TRAINING PROGRAM

## 2024-05-09 PROCEDURE — 99213 OFFICE O/P EST LOW 20 MIN: CPT | Mod: PBBFAC

## 2024-05-09 PROCEDURE — 3078F DIAST BP <80 MM HG: CPT | Mod: CPTII,,, | Performed by: STUDENT IN AN ORGANIZED HEALTH CARE EDUCATION/TRAINING PROGRAM

## 2024-05-09 PROCEDURE — 1160F RVW MEDS BY RX/DR IN RCRD: CPT | Mod: CPTII,,, | Performed by: STUDENT IN AN ORGANIZED HEALTH CARE EDUCATION/TRAINING PROGRAM

## 2024-05-09 PROCEDURE — 4010F ACE/ARB THERAPY RXD/TAKEN: CPT | Mod: CPTII,,, | Performed by: STUDENT IN AN ORGANIZED HEALTH CARE EDUCATION/TRAINING PROGRAM

## 2024-05-16 ENCOUNTER — OFFICE VISIT (OUTPATIENT)
Dept: CARDIOLOGY | Facility: CLINIC | Age: 49
End: 2024-05-16
Payer: MEDICAID

## 2024-05-16 VITALS
SYSTOLIC BLOOD PRESSURE: 96 MMHG | DIASTOLIC BLOOD PRESSURE: 52 MMHG | RESPIRATION RATE: 20 BRPM | OXYGEN SATURATION: 98 % | TEMPERATURE: 98 F | HEART RATE: 67 BPM | HEIGHT: 66 IN | WEIGHT: 112.38 LBS | BODY MASS INDEX: 18.06 KG/M2

## 2024-05-16 DIAGNOSIS — I50.20 HFREF (HEART FAILURE WITH REDUCED EJECTION FRACTION): Primary | Chronic | ICD-10-CM

## 2024-05-16 PROCEDURE — 99214 OFFICE O/P EST MOD 30 MIN: CPT | Mod: PBBFAC | Performed by: INTERNAL MEDICINE

## 2024-05-16 RX ORDER — FUROSEMIDE 20 MG/1
20 TABLET ORAL DAILY PRN
Qty: 90 TABLET | Refills: 3 | Status: SHIPPED | OUTPATIENT
Start: 2024-05-16 | End: 2025-05-16

## 2024-05-16 RX ORDER — METOPROLOL SUCCINATE 50 MG/1
50 TABLET, EXTENDED RELEASE ORAL DAILY
Qty: 30 TABLET | Refills: 6 | Status: SHIPPED | OUTPATIENT
Start: 2024-05-16 | End: 2024-05-16

## 2024-05-16 RX ORDER — METOPROLOL SUCCINATE 50 MG/1
50 TABLET, EXTENDED RELEASE ORAL DAILY
Qty: 90 TABLET | Refills: 3 | Status: SHIPPED | OUTPATIENT
Start: 2024-05-16

## 2024-05-16 NOTE — PROGRESS NOTES
CARDIOLOGY CLINIC NOTE    CHIEF COMPLAINT:   Chief Complaint   Patient presents with    f/u denies chest pain or sob main issues is side effect fro                 HPI:  Todd Bernal 49 y.o. male with no significant PMH who presents to Cardiology Clinic to establish care.  Of note, he presented to [facility] ED on  with complaints of SOB x1 week. 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 SOB started after exposure. On arrival noted to have elevated BNP and Cardiomegaly. Subsequent workup showed severely reduced EF with moderate to severe MR on TTE.  Patient underwent LHC which revealed no evidence of coronary artery disease, all coronaries were normal.  Patient also underwent ROXANNE which revealed an EF of 20-25%, no evidence of valvular disease.  Patient endorsed family history of premature CAD.  He was seen by Dr. Fonseca while inpatient.     Today the patient states that he is feeling well overall.  He denies any cardiac complaints such as chest pain, palpitations, shortness breath, PND, orthopnea, lightheadedness, dizziness, syncope, claudication symptoms, or peripheral edema.  He reports he is following heart healthy low Na fluid restricted diet and has significantly decreased caffeine intake. He used to drink 4-5 energy drinks per day and coffee continuously.  He states that he is able to complete his ADLs without any issues or ischemic symptoms.                                                                                                                                                                                                                                                                                  CARDIAC TESTIN Day Cardiac Event Monitor 10.1..23  Underlying rhythm is sinus   No symptoms reported  No patient activated events   Patient was noted to have 3.2nd pause.  Pauses greater than 3 seconds or considered significant    ROXANNE 9.8.23    Left Ventricle: The left ventricle  is moderately dilated. Normal wall thickness. Global hypokinesis present. There is severely reduced systolic function with a visually estimated ejection fraction of 20 - 25%.    Left Atrium: Left atrium was not well visualized.    Right Ventricle: Normal right ventricular cavity size. Systolic function is normal.    Aortic Valve: The aortic valve is a trileaflet valve.    Echo 9.3.23    Left Ventricle: The left ventricle is moderately dilated. There is severely reduced systolic function with a visually estimated ejection fraction of 15 - 20%.    Left Atrium: Left atrium is dilated. LA volume index is 62ml/m2.    Right Ventricle: Normal right ventricular cavity size. Systolic function is normal.    Right Atrium: Right atrium is mildly dilated.    Aortic Valve: The aortic valve is a trileaflet valve.    Mitral Valve: There is moderate to severe regurgitation    Tricuspid Valve: There is mild to moderate regurgitation.    Pulmonary Artery: The estimated pulmonary artery systolic pressure is 50 mmHg.    IVC/SVC: Normal venous pressure at 3 mmHg.    Pericardium: There is a small effusion. Bilateral pleural effusions.    Cardiac catheterization 9.3.23    Conclusion    The pre-procedure left ventricular end diastolic pressure was 20.    The estimated blood loss was <50 mL.    The coronary arteries were normal..    FINDINGS  LVEDP:  20 mmHg  Left Main:  No stenosis  LAD:  No stenosis  Circumflex:  No stenosis  RCA:  No stenosis  The patient has No CAD  Blood loss:  less than 10 cc.    RECOMMENDATIONS  Risk factor modifications  Activity -- avoid straining with affected limb for one week  Exercise -- as tolerated  Precautions post D/C -- come to ER for hematoma, unusual pain, erythema, or unusual drainage at access site  Precautions post D/C -- if develop bleeding or hematoma at access site, hold pressure at access site, and come to ER    POST-CATH GUIDELINE FOR INPATIENT DISCHARGE  No hematoma or swelling at access site  No  unusual tenderness at access site  Adequate distal pulse or capillary refill in limb(s) accessed  No unusual difficulty with ambulation after bed rest is over        Patient Active Problem List   Diagnosis    Nonrheumatic mitral valve regurgitation    HFrEF (heart failure with reduced ejection fraction)    Acute on chronic HFrEF (heart failure with reduced ejection fraction)    Tachycardia    Gynecomastia, male     Past Surgical History:   Procedure Laterality Date    ANGIOGRAM, CORONARY, WITH LEFT HEART CATHETERIZATION N/A 09/06/2023    Procedure: Angiogram, Coronary, with Left Heart Cath;  Surgeon: Lexa Fonseca MD;  Location: [facility] CATH LAB;  Service: Cardiology;  Laterality: N/A;    CARDIAC CATHETERIZATION       Social History     Socioeconomic History    Marital status: Unknown   Tobacco Use    Smoking status: Never    Smokeless tobacco: Never   Substance and Sexual Activity    Alcohol use: Never    Drug use: Never     Social Determinants of Health     Financial Resource Strain: Low Risk  (9/5/2023)    Overall Financial Resource Strain (CARDIA)     Difficulty of Paying Living Expenses: Not hard at all   Food Insecurity: No Food Insecurity (9/5/2023)    Hunger Vital Sign     Worried About Running Out of Food in the Last Year: Never true     Ran Out of Food in the Last Year: Never true   Transportation Needs: No Transportation Needs (9/5/2023)    PRAPARE - Transportation     Lack of Transportation (Medical): No     Lack of Transportation (Non-Medical): No   Physical Activity: Inactive (9/5/2023)    Exercise Vital Sign     Days of Exercise per Week: 0 days     Minutes of Exercise per Session: 0 min   Stress: No Stress Concern Present (9/5/2023)    [employer] - Occupational Stress Questionnaire     Feeling of Stress : Not at all   Housing Stability: Low Risk  (9/5/2023)    Housing Stability Vital Sign     Unable to Pay for Housing in the Last Year: No     Number of Places Lived in  "the Last Year: 1     Unstable Housing in the Last Year: No        Family History   Problem Relation Name Age of Onset    Hyperlipidemia Father      Heart disease Father       Review of patient's allergies indicates:   Allergen Reactions    Phenergan [promethazine]          ROS:  Review of Systems   Constitutional:  Negative for diaphoresis and malaise/fatigue.   HENT: Negative.     Eyes: Negative.  Negative for blurred vision and double vision.   Respiratory: Negative.  Negative for cough, sputum production and shortness of breath.    Cardiovascular: Negative.  Negative for chest pain, palpitations, orthopnea, claudication, leg swelling and PND.   Gastrointestinal: Negative.  Negative for abdominal pain.   Genitourinary: Negative.    Musculoskeletal: Negative.  Negative for falls.   Skin: Negative.    Neurological: Negative.  Negative for dizziness, loss of consciousness, weakness and headaches.   Endo/Heme/Allergies: Negative.    Psychiatric/Behavioral:  The patient is not nervous/anxious.                                                                                                                                                                                 Negative except as stated in the history of present illness. See HPI for details.    PHYSICAL EXAM:  Visit Vitals  BP (!) 87/58 (BP Location: Left arm, Patient Position: Sitting, BP Method: Medium (Automatic))   Pulse 67   Temp 98.4 °F (36.9 °C) (Oral)   Resp 20   Ht 5' 6" (1.676 m)   Wt 51 kg (112 lb 6.4 oz)   SpO2 98%   BMI 18.14 kg/m²       Physical Exam  Constitutional:       Appearance: Normal appearance.   HENT:      Head: Normocephalic.   Eyes:      Extraocular Movements: Extraocular movements intact.   Neck:      Vascular: No carotid bruit.   Cardiovascular:      Rate and Rhythm: Normal rate and regular rhythm.      Pulses: Normal pulses.      Heart sounds: Normal heart sounds.   Pulmonary:      Effort: Pulmonary effort is normal.      Breath " sounds: Normal breath sounds.   Abdominal:      General: There is no distension.   Musculoskeletal:         General: Normal range of motion.      Right lower leg: No edema.      Left lower leg: No edema.   Skin:     General: Skin is warm and dry.   Neurological:      General: No focal deficit present.      Mental Status: He is alert and oriented to person, place, and time.      Motor: No weakness.   Psychiatric:         Mood and Affect: Mood normal.         Behavior: Behavior normal.         Current Outpatient Medications   Medication Instructions    dextroamphetamine-amphetamine (ADDERALL XR) 5 MG 24 hr capsule 5 mg, Oral, 3 times daily    empagliflozin (JARDIANCE) 10 mg, Oral, Daily    furosemide (LASIX) 20 mg, Oral, Daily    metoprolol succinate (TOPROL-XL) 100 mg, Oral, Daily    sacubitriL-valsartan (ENTRESTO) 24-26 mg per tablet 1 tablet, Oral, 2 times daily    spironolactone (ALDACTONE) 25 mg, Oral, Daily    venlafaxine (EFFEXOR-XR) 75 mg, Oral        All medications, laboratory studies, cardiac diagnostic imaging reviewed.     Lab Results   Component Value Date    LDL 88.00 09/04/2023    TRIG 80 09/04/2023    CREATININE 0.72 (L) 09/08/2023    MG 2.20 09/08/2023    K 4.3 09/08/2023        ASSESSMENT/PLAN:     HFrecEF (53% on 3/28, previously 20-25%)   - Asymptomatic today (see HPI)    - HF Status: NYHA Class I   - LVEF: 15-20% (TTE on 9.5.23)    - LVEF: 20-25% (ROXANNE on 9.8.23)  - LVEF: 53% (ROXANNE on 3/28/24)   - Continue Entresto and Jardiance   - Discontinue Spironolactone secondary to gynecomastia and hypotension  - decrease Metroprolol to 50 mg daily secondary to pauses on holter (see below)   - Continue Lasix PRN   - Strict Is/Os and daily weights   - Ischemic workup (LHC) negative (see full report above)   - 30 day cardiac event monitor was ordered to evaluate for underlying rate/rhythm as possible cars for cardiomyopathy.  Thirty day cardiac event monitor did reveal a normal sinus rhythm as the predominant  rhythm, however patient was noted to have several pauses with the longest pause being 3.3 seconds long.   - Strict ED precautions given     Mitral Regurgitation    - Stable from last appointment, denies symptoms     - Moderate to severe on previous TTE, no remarked on on most recent TTE   - No evidence of MR on subsequent ROXANNE   - No murmur appreciated on exam     HTN   - BP 96/52 today with recovered EF   - Continue/discontinue/decrease medications as above   - Continue Low Na Diet, 1.5L fluid restriction and Exercise as tolerated      Lorne Vazquez DO  [facility] Internal Medicine HO-1

## 2024-08-08 ENCOUNTER — OFFICE VISIT (OUTPATIENT)
Dept: SURGERY | Facility: CLINIC | Age: 49
End: 2024-08-08
Payer: MEDICAID

## 2024-08-08 VITALS
RESPIRATION RATE: 18 BRPM | DIASTOLIC BLOOD PRESSURE: 80 MMHG | SYSTOLIC BLOOD PRESSURE: 117 MMHG | WEIGHT: 115 LBS | BODY MASS INDEX: 18.48 KG/M2 | HEIGHT: 66 IN | OXYGEN SATURATION: 97 % | TEMPERATURE: 98 F | HEART RATE: 93 BPM

## 2024-08-08 DIAGNOSIS — N62 GYNECOMASTIA, MALE: Primary | ICD-10-CM

## 2024-08-08 PROCEDURE — 3074F SYST BP LT 130 MM HG: CPT | Mod: CPTII,,, | Performed by: SURGERY

## 2024-08-08 PROCEDURE — 1159F MED LIST DOCD IN RCRD: CPT | Mod: CPTII,,, | Performed by: SURGERY

## 2024-08-08 PROCEDURE — 3079F DIAST BP 80-89 MM HG: CPT | Mod: CPTII,,, | Performed by: SURGERY

## 2024-08-08 PROCEDURE — 3008F BODY MASS INDEX DOCD: CPT | Mod: CPTII,,, | Performed by: SURGERY

## 2024-08-08 PROCEDURE — 99213 OFFICE O/P EST LOW 20 MIN: CPT | Mod: PBBFAC

## 2024-08-08 PROCEDURE — 99213 OFFICE O/P EST LOW 20 MIN: CPT | Mod: S$PBB,,, | Performed by: SURGERY

## 2024-08-08 PROCEDURE — 4010F ACE/ARB THERAPY RXD/TAKEN: CPT | Mod: CPTII,,, | Performed by: SURGERY

## 2024-08-20 ENCOUNTER — TELEPHONE (OUTPATIENT)
Dept: INTERNAL MEDICINE | Facility: CLINIC | Age: 49
End: 2024-08-20
Payer: MEDICAID

## 2024-08-29 ENCOUNTER — TELEPHONE (OUTPATIENT)
Dept: INTERNAL MEDICINE | Facility: CLINIC | Age: 49
End: 2024-08-29
Payer: MEDICAID

## 2024-08-29 NOTE — TELEPHONE ENCOUNTER
Placed call to [facility] pharmacy and spoke with,Gadiel Farrell's pharmacist.  This nurse provided heart failure I50.2 dx code for Entresto rx. Gadiel Farrell's pharmacist verbalized complete understanding.

## 2024-10-08 ENCOUNTER — HOSPITAL ENCOUNTER (OUTPATIENT)
Dept: CARDIOLOGY | Facility: HOSPITAL | Age: 49
Discharge: HOME | End: 2024-10-08
Attending: NURSE PRACTITIONER
Payer: MEDICAID

## 2024-10-08 DIAGNOSIS — F42.9 OBSESSIVE-COMPULSIVE DISORDER: ICD-10-CM

## 2024-10-08 DIAGNOSIS — F41.9 ANXIETY DISORDER: Primary | ICD-10-CM

## 2024-10-08 DIAGNOSIS — F41.9 ANXIETY DISORDER: ICD-10-CM

## 2024-10-08 LAB
OHS QRS DURATION: 108 MS
OHS QTC CALCULATION: 438 MS

## 2024-10-08 PROCEDURE — 93005 ELECTROCARDIOGRAM TRACING: CPT

## 2024-10-28 ENCOUNTER — ANESTHESIA EVENT (OUTPATIENT)
Dept: ENDOSCOPY | Facility: HOSPITAL | Age: 49
End: 2024-10-28
Payer: MEDICAID

## 2024-10-28 ENCOUNTER — ANESTHESIA (OUTPATIENT)
Dept: ENDOSCOPY | Facility: HOSPITAL | Age: 49
End: 2024-10-28
Payer: MEDICAID

## 2024-10-28 ENCOUNTER — HOSPITAL ENCOUNTER (OUTPATIENT)
Facility: HOSPITAL | Age: 49
LOS: 1 days | Discharge: HOME | End: 2024-10-29
Attending: EMERGENCY MEDICINE | Admitting: STUDENT IN AN ORGANIZED HEALTH CARE EDUCATION/TRAINING PROGRAM
Payer: MEDICAID

## 2024-10-28 DIAGNOSIS — K92.2 GIB (GASTROINTESTINAL BLEEDING): Primary | ICD-10-CM

## 2024-10-28 DIAGNOSIS — R07.9 CHEST PAIN: ICD-10-CM

## 2024-10-28 LAB
ALBUMIN SERPL-MCNC: 4.1 G/DL (ref 3.5–5)
ALBUMIN/GLOB SERPL: 1.3 RATIO (ref 1.1–2)
ALP SERPL-CCNC: 64 UNIT/L (ref 40–150)
ALT SERPL-CCNC: 14 UNIT/L (ref 0–55)
ANION GAP SERPL CALC-SCNC: 10 MEQ/L
APTT PPP: 30.5 SECONDS (ref 23.2–33.7)
AST SERPL-CCNC: 13 UNIT/L (ref 5–34)
BASOPHILS # BLD AUTO: 0.02 X10(3)/MCL
BASOPHILS NFR BLD AUTO: 0.4 %
BILIRUB SERPL-MCNC: 0.3 MG/DL
BUN SERPL-MCNC: 12.9 MG/DL (ref 8.9–20.6)
CALCIUM SERPL-MCNC: 9.1 MG/DL (ref 8.4–10.2)
CHLORIDE SERPL-SCNC: 106 MMOL/L (ref 98–107)
CO2 SERPL-SCNC: 27 MMOL/L (ref 22–29)
CREAT SERPL-MCNC: 0.88 MG/DL (ref 0.72–1.25)
CREAT/UREA NIT SERPL: 15
EOSINOPHIL # BLD AUTO: 0.01 X10(3)/MCL (ref 0–0.9)
EOSINOPHIL NFR BLD AUTO: 0.2 %
ERYTHROCYTE [DISTWIDTH] IN BLOOD BY AUTOMATED COUNT: 12.6 % (ref 11.5–17)
GFR SERPLBLD CREATININE-BSD FMLA CKD-EPI: >60 ML/MIN/1.73/M2
GLOBULIN SER-MCNC: 3.2 GM/DL (ref 2.4–3.5)
GLUCOSE SERPL-MCNC: 125 MG/DL (ref 74–100)
HCT VFR BLD AUTO: 42.3 % (ref 42–52)
HCT VFR BLD AUTO: 43.6 % (ref 42–52)
HGB BLD-MCNC: 14.3 G/DL (ref 14–18)
HGB BLD-MCNC: 14.4 G/DL (ref 14–18)
IMM GRANULOCYTES # BLD AUTO: 0.01 X10(3)/MCL (ref 0–0.04)
IMM GRANULOCYTES NFR BLD AUTO: 0.2 %
INR PPP: 1
LYMPHOCYTES # BLD AUTO: 1.31 X10(3)/MCL (ref 0.6–4.6)
LYMPHOCYTES NFR BLD AUTO: 28.7 %
MCH RBC QN AUTO: 30.7 PG (ref 27–31)
MCHC RBC AUTO-ENTMCNC: 34 G/DL (ref 33–36)
MCV RBC AUTO: 90.2 FL (ref 80–94)
MONOCYTES # BLD AUTO: 0.25 X10(3)/MCL (ref 0.1–1.3)
MONOCYTES NFR BLD AUTO: 5.5 %
NEUTROPHILS # BLD AUTO: 2.97 X10(3)/MCL (ref 2.1–9.2)
NEUTROPHILS NFR BLD AUTO: 65 %
NRBC BLD AUTO-RTO: 0 %
PLATELET # BLD AUTO: 290 X10(3)/MCL (ref 130–400)
PMV BLD AUTO: 9.4 FL (ref 7.4–10.4)
POTASSIUM SERPL-SCNC: 3.7 MMOL/L (ref 3.5–5.1)
PROT SERPL-MCNC: 7.3 GM/DL (ref 6.4–8.3)
PROTHROMBIN TIME: 13 SECONDS (ref 12.5–14.5)
RBC # BLD AUTO: 4.69 X10(6)/MCL (ref 4.7–6.1)
SODIUM SERPL-SCNC: 143 MMOL/L (ref 136–145)
WBC # BLD AUTO: 4.57 X10(3)/MCL (ref 4.5–11.5)

## 2024-10-28 PROCEDURE — 43239 EGD BIOPSY SINGLE/MULTIPLE: CPT | Performed by: INTERNAL MEDICINE

## 2024-10-28 PROCEDURE — 63600175 PHARM REV CODE 636 W HCPCS: Performed by: NURSE ANESTHETIST, CERTIFIED REGISTERED

## 2024-10-28 PROCEDURE — 37000008 HC ANESTHESIA 1ST 15 MINUTES: Performed by: INTERNAL MEDICINE

## 2024-10-28 PROCEDURE — 27201423 OPTIME MED/SURG SUP & DEVICES STERILE SUPPLY: Performed by: INTERNAL MEDICINE

## 2024-10-28 PROCEDURE — 63600175 PHARM REV CODE 636 W HCPCS: Performed by: EMERGENCY MEDICINE

## 2024-10-28 PROCEDURE — 63600175 PHARM REV CODE 636 W HCPCS: Performed by: NURSE PRACTITIONER

## 2024-10-28 PROCEDURE — 25000003 PHARM REV CODE 250: Performed by: STUDENT IN AN ORGANIZED HEALTH CARE EDUCATION/TRAINING PROGRAM

## 2024-10-28 PROCEDURE — 88305 TISSUE EXAM BY PATHOLOGIST: CPT | Performed by: INTERNAL MEDICINE

## 2024-10-28 PROCEDURE — 36415 COLL VENOUS BLD VENIPUNCTURE: CPT | Performed by: NURSE PRACTITIONER

## 2024-10-28 PROCEDURE — G0378 HOSPITAL OBSERVATION PER HR: HCPCS

## 2024-10-28 PROCEDURE — 85730 THROMBOPLASTIN TIME PARTIAL: CPT | Performed by: EMERGENCY MEDICINE

## 2024-10-28 PROCEDURE — 85610 PROTHROMBIN TIME: CPT | Performed by: EMERGENCY MEDICINE

## 2024-10-28 PROCEDURE — 88313 SPECIAL STAINS GROUP 2: CPT

## 2024-10-28 PROCEDURE — 25000003 PHARM REV CODE 250: Performed by: NURSE ANESTHETIST, CERTIFIED REGISTERED

## 2024-10-28 PROCEDURE — 88342 IMHCHEM/IMCYTCHM 1ST ANTB: CPT

## 2024-10-28 PROCEDURE — 37000009 HC ANESTHESIA EA ADD 15 MINS: Performed by: INTERNAL MEDICINE

## 2024-10-28 PROCEDURE — 25000003 PHARM REV CODE 250: Performed by: NURSE PRACTITIONER

## 2024-10-28 PROCEDURE — 85014 HEMATOCRIT: CPT | Performed by: NURSE PRACTITIONER

## 2024-10-28 PROCEDURE — 80053 COMPREHEN METABOLIC PANEL: CPT | Performed by: EMERGENCY MEDICINE

## 2024-10-28 PROCEDURE — 85025 COMPLETE CBC W/AUTO DIFF WBC: CPT | Performed by: EMERGENCY MEDICINE

## 2024-10-28 RX ORDER — METOPROLOL SUCCINATE 50 MG/1
50 TABLET, EXTENDED RELEASE ORAL DAILY
Status: DISCONTINUED | OUTPATIENT
Start: 2024-10-29 | End: 2024-10-29 | Stop reason: HOSPADM

## 2024-10-28 RX ORDER — PROPOFOL 10 MG/ML
VIAL (ML) INTRAVENOUS
Status: COMPLETED
Start: 2024-10-28 | End: 2024-10-28

## 2024-10-28 RX ORDER — ONDANSETRON HYDROCHLORIDE 2 MG/ML
4 INJECTION, SOLUTION INTRAVENOUS
Status: COMPLETED | OUTPATIENT
Start: 2024-10-28 | End: 2024-10-28

## 2024-10-28 RX ORDER — TALC
6 POWDER (GRAM) TOPICAL NIGHTLY PRN
Status: DISCONTINUED | OUTPATIENT
Start: 2024-10-28 | End: 2024-10-29 | Stop reason: HOSPADM

## 2024-10-28 RX ORDER — ONDANSETRON HYDROCHLORIDE 2 MG/ML
4 INJECTION, SOLUTION INTRAVENOUS EVERY 4 HOURS PRN
Status: DISCONTINUED | OUTPATIENT
Start: 2024-10-28 | End: 2024-10-29 | Stop reason: HOSPADM

## 2024-10-28 RX ORDER — PROPOFOL 10 MG/ML
VIAL (ML) INTRAVENOUS CONTINUOUS PRN
Status: DISCONTINUED | OUTPATIENT
Start: 2024-10-28 | End: 2024-10-28

## 2024-10-28 RX ORDER — SODIUM CHLORIDE, SODIUM LACTATE, POTASSIUM CHLORIDE, CALCIUM CHLORIDE 600; 310; 30; 20 MG/100ML; MG/100ML; MG/100ML; MG/100ML
1000 INJECTION, SOLUTION INTRAVENOUS
Status: COMPLETED | OUTPATIENT
Start: 2024-10-28 | End: 2024-10-28

## 2024-10-28 RX ORDER — FLUVOXAMINE MALEATE 50 MG/1
50 TABLET, FILM COATED ORAL NIGHTLY
COMMUNITY

## 2024-10-28 RX ORDER — FLUVOXAMINE MALEATE 50 MG/1
50 TABLET, FILM COATED ORAL NIGHTLY
Status: DISCONTINUED | OUTPATIENT
Start: 2024-10-28 | End: 2024-10-29 | Stop reason: HOSPADM

## 2024-10-28 RX ORDER — ALUMINUM HYDROXIDE, MAGNESIUM HYDROXIDE, AND SIMETHICONE 1200; 120; 1200 MG/30ML; MG/30ML; MG/30ML
30 SUSPENSION ORAL 4 TIMES DAILY PRN
Status: DISCONTINUED | OUTPATIENT
Start: 2024-10-28 | End: 2024-10-29 | Stop reason: HOSPADM

## 2024-10-28 RX ORDER — SPIRONOLACTONE 25 MG/1
25 TABLET ORAL DAILY
COMMUNITY

## 2024-10-28 RX ORDER — ACETAMINOPHEN 325 MG/1
650 TABLET ORAL EVERY 6 HOURS PRN
Status: DISCONTINUED | OUTPATIENT
Start: 2024-10-28 | End: 2024-10-29 | Stop reason: HOSPADM

## 2024-10-28 RX ORDER — PANTOPRAZOLE SODIUM 40 MG/10ML
80 INJECTION, POWDER, LYOPHILIZED, FOR SOLUTION INTRAVENOUS
Status: COMPLETED | OUTPATIENT
Start: 2024-10-28 | End: 2024-10-28

## 2024-10-28 RX ORDER — VENLAFAXINE HYDROCHLORIDE 37.5 MG/1
75 CAPSULE, EXTENDED RELEASE ORAL DAILY
Status: DISCONTINUED | OUTPATIENT
Start: 2024-10-29 | End: 2024-10-29 | Stop reason: HOSPADM

## 2024-10-28 RX ORDER — NALOXONE HCL 0.4 MG/ML
0.02 VIAL (ML) INJECTION
Status: DISCONTINUED | OUTPATIENT
Start: 2024-10-28 | End: 2024-10-29 | Stop reason: HOSPADM

## 2024-10-28 RX ORDER — LIDOCAINE HYDROCHLORIDE 20 MG/ML
INJECTION INTRAVENOUS
Status: DISCONTINUED | OUTPATIENT
Start: 2024-10-28 | End: 2024-10-28

## 2024-10-28 RX ORDER — SIMETHICONE 80 MG
1 TABLET,CHEWABLE ORAL 4 TIMES DAILY PRN
Status: DISCONTINUED | OUTPATIENT
Start: 2024-10-28 | End: 2024-10-29 | Stop reason: HOSPADM

## 2024-10-28 RX ORDER — PANTOPRAZOLE SODIUM 40 MG/10ML
40 INJECTION, POWDER, LYOPHILIZED, FOR SOLUTION INTRAVENOUS 2 TIMES DAILY
Status: DISCONTINUED | OUTPATIENT
Start: 2024-10-28 | End: 2024-10-29 | Stop reason: HOSPADM

## 2024-10-28 RX ORDER — POLYETHYLENE GLYCOL 3350 17 G/17G
17 POWDER, FOR SOLUTION ORAL 2 TIMES DAILY PRN
Status: DISCONTINUED | OUTPATIENT
Start: 2024-10-28 | End: 2024-10-29 | Stop reason: HOSPADM

## 2024-10-28 RX ADMIN — FLUVOXAMINE MALEATE 50 MG: 50 TABLET ORAL at 08:10

## 2024-10-28 RX ADMIN — ONDANSETRON 4 MG: 2 INJECTION INTRAMUSCULAR; INTRAVENOUS at 08:10

## 2024-10-28 RX ADMIN — ONDANSETRON 4 MG: 2 INJECTION INTRAMUSCULAR; INTRAVENOUS at 04:10

## 2024-10-28 RX ADMIN — SODIUM CHLORIDE: 9 INJECTION, SOLUTION INTRAVENOUS at 12:10

## 2024-10-28 RX ADMIN — LIDOCAINE HYDROCHLORIDE 40 MG: 20 INJECTION INTRAVENOUS at 12:10

## 2024-10-28 RX ADMIN — PROPOFOL 50 MG: 10 INJECTION, EMULSION INTRAVENOUS at 12:10

## 2024-10-28 RX ADMIN — Medication 6 MG: at 06:10

## 2024-10-28 RX ADMIN — SODIUM CHLORIDE, POTASSIUM CHLORIDE, SODIUM LACTATE AND CALCIUM CHLORIDE 1000 ML: 600; 310; 30; 20 INJECTION, SOLUTION INTRAVENOUS at 05:10

## 2024-10-28 RX ADMIN — PANTOPRAZOLE SODIUM 80 MG: 40 INJECTION, POWDER, LYOPHILIZED, FOR SOLUTION INTRAVENOUS at 03:10

## 2024-10-28 RX ADMIN — PROPOFOL 150 MCG/KG/MIN: 10 INJECTION, EMULSION INTRAVENOUS at 12:10

## 2024-10-28 RX ADMIN — PANTOPRAZOLE SODIUM 40 MG: 40 INJECTION, POWDER, LYOPHILIZED, FOR SOLUTION INTRAVENOUS at 04:10

## 2024-10-29 VITALS
OXYGEN SATURATION: 98 % | BODY MASS INDEX: 18.48 KG/M2 | DIASTOLIC BLOOD PRESSURE: 62 MMHG | HEIGHT: 66 IN | TEMPERATURE: 98 F | RESPIRATION RATE: 18 BRPM | SYSTOLIC BLOOD PRESSURE: 103 MMHG | WEIGHT: 115 LBS | HEART RATE: 65 BPM

## 2024-10-29 PROBLEM — K92.2 GIB (GASTROINTESTINAL BLEEDING): Status: ACTIVE | Noted: 2024-10-29

## 2024-10-29 LAB
ALBUMIN SERPL-MCNC: 3.8 G/DL (ref 3.5–5)
ALBUMIN/GLOB SERPL: 1.7 RATIO (ref 1.1–2)
ALP SERPL-CCNC: 55 UNIT/L (ref 40–150)
ALT SERPL-CCNC: 12 UNIT/L (ref 0–55)
ANION GAP SERPL CALC-SCNC: 8 MEQ/L
AST SERPL-CCNC: 12 UNIT/L (ref 5–34)
BASOPHILS # BLD AUTO: 0.04 X10(3)/MCL
BASOPHILS NFR BLD AUTO: 0.6 %
BILIRUB SERPL-MCNC: 0.4 MG/DL
BUN SERPL-MCNC: 18.8 MG/DL (ref 8.9–20.6)
CALCIUM SERPL-MCNC: 8.8 MG/DL (ref 8.4–10.2)
CHLORIDE SERPL-SCNC: 107 MMOL/L (ref 98–107)
CO2 SERPL-SCNC: 25 MMOL/L (ref 22–29)
CREAT SERPL-MCNC: 0.82 MG/DL (ref 0.72–1.25)
CREAT/UREA NIT SERPL: 23
EOSINOPHIL # BLD AUTO: 0.13 X10(3)/MCL (ref 0–0.9)
EOSINOPHIL NFR BLD AUTO: 1.9 %
ERYTHROCYTE [DISTWIDTH] IN BLOOD BY AUTOMATED COUNT: 13 % (ref 11.5–17)
GFR SERPLBLD CREATININE-BSD FMLA CKD-EPI: >60 ML/MIN/1.73/M2
GLOBULIN SER-MCNC: 2.3 GM/DL (ref 2.4–3.5)
GLUCOSE SERPL-MCNC: 124 MG/DL (ref 74–100)
HCT VFR BLD AUTO: 36.9 % (ref 42–52)
HGB BLD-MCNC: 12.1 G/DL (ref 14–18)
IMM GRANULOCYTES # BLD AUTO: 0.01 X10(3)/MCL (ref 0–0.04)
IMM GRANULOCYTES NFR BLD AUTO: 0.1 %
LYMPHOCYTES # BLD AUTO: 2.4 X10(3)/MCL (ref 0.6–4.6)
LYMPHOCYTES NFR BLD AUTO: 34.6 %
MAGNESIUM SERPL-MCNC: 2.2 MG/DL (ref 1.6–2.6)
MCH RBC QN AUTO: 30.9 PG (ref 27–31)
MCHC RBC AUTO-ENTMCNC: 32.8 G/DL (ref 33–36)
MCV RBC AUTO: 94.4 FL (ref 80–94)
MONOCYTES # BLD AUTO: 0.73 X10(3)/MCL (ref 0.1–1.3)
MONOCYTES NFR BLD AUTO: 10.5 %
NEUTROPHILS # BLD AUTO: 3.62 X10(3)/MCL (ref 2.1–9.2)
NEUTROPHILS NFR BLD AUTO: 52.3 %
NRBC BLD AUTO-RTO: 0 %
PHOSPHATE SERPL-MCNC: 2.7 MG/DL (ref 2.3–4.7)
PLATELET # BLD AUTO: 230 X10(3)/MCL (ref 130–400)
PMV BLD AUTO: 9.5 FL (ref 7.4–10.4)
POTASSIUM SERPL-SCNC: 4.1 MMOL/L (ref 3.5–5.1)
PROT SERPL-MCNC: 6.1 GM/DL (ref 6.4–8.3)
PSYCHE PATHOLOGY RESULT: NORMAL
RBC # BLD AUTO: 3.91 X10(6)/MCL (ref 4.7–6.1)
SODIUM SERPL-SCNC: 140 MMOL/L (ref 136–145)
WBC # BLD AUTO: 6.93 X10(3)/MCL (ref 4.5–11.5)

## 2024-10-29 PROCEDURE — 25000003 PHARM REV CODE 250: Performed by: STUDENT IN AN ORGANIZED HEALTH CARE EDUCATION/TRAINING PROGRAM

## 2024-10-29 PROCEDURE — 80053 COMPREHEN METABOLIC PANEL: CPT | Performed by: NURSE PRACTITIONER

## 2024-10-29 PROCEDURE — 36415 COLL VENOUS BLD VENIPUNCTURE: CPT | Performed by: NURSE PRACTITIONER

## 2024-10-29 PROCEDURE — 83735 ASSAY OF MAGNESIUM: CPT | Performed by: NURSE PRACTITIONER

## 2024-10-29 PROCEDURE — 63600175 PHARM REV CODE 636 W HCPCS: Performed by: NURSE PRACTITIONER

## 2024-10-29 PROCEDURE — 84100 ASSAY OF PHOSPHORUS: CPT | Performed by: NURSE PRACTITIONER

## 2024-10-29 PROCEDURE — 85025 COMPLETE CBC W/AUTO DIFF WBC: CPT | Performed by: NURSE PRACTITIONER

## 2024-10-29 PROCEDURE — G0378 HOSPITAL OBSERVATION PER HR: HCPCS

## 2024-10-29 RX ORDER — HYDROCODONE BITARTRATE AND ACETAMINOPHEN 5; 325 MG/1; MG/1
1 TABLET ORAL EVERY 6 HOURS PRN
Qty: 20 TABLET | Refills: 0 | Status: SHIPPED | OUTPATIENT
Start: 2024-10-29 | End: 2024-11-03

## 2024-10-29 RX ORDER — PANTOPRAZOLE SODIUM 40 MG/1
40 TABLET, DELAYED RELEASE ORAL DAILY
Qty: 90 TABLET | Refills: 3 | Status: SHIPPED | OUTPATIENT
Start: 2024-10-29 | End: 2025-10-29

## 2024-10-29 RX ADMIN — VENLAFAXINE HYDROCHLORIDE 75 MG: 37.5 CAPSULE, EXTENDED RELEASE ORAL at 09:10

## 2024-10-29 RX ADMIN — PANTOPRAZOLE SODIUM 40 MG: 40 INJECTION, POWDER, LYOPHILIZED, FOR SOLUTION INTRAVENOUS at 10:10

## 2024-11-04 ENCOUNTER — OFFICE VISIT (OUTPATIENT)
Dept: INTERNAL MEDICINE | Facility: CLINIC | Age: 49
End: 2024-11-04
Payer: MEDICAID

## 2024-11-04 VITALS
SYSTOLIC BLOOD PRESSURE: 107 MMHG | HEIGHT: 66 IN | OXYGEN SATURATION: 94 % | RESPIRATION RATE: 20 BRPM | WEIGHT: 114.63 LBS | DIASTOLIC BLOOD PRESSURE: 73 MMHG | TEMPERATURE: 98 F | HEART RATE: 81 BPM | BODY MASS INDEX: 18.42 KG/M2

## 2024-11-04 DIAGNOSIS — Z12.12 ENCOUNTER FOR COLORECTAL CANCER SCREENING: Primary | ICD-10-CM

## 2024-11-04 DIAGNOSIS — Z12.11 ENCOUNTER FOR COLORECTAL CANCER SCREENING: Primary | ICD-10-CM

## 2024-11-04 PROCEDURE — 99214 OFFICE O/P EST MOD 30 MIN: CPT | Mod: PBBFAC

## 2024-11-04 NOTE — PROGRESS NOTES
[facility] Visit    Chief Complaint:      Follow-up (Hospital follow up for GI bleed)     Subjective:     HPI:  Todd Bernal is a 49 y.o. male with PMH of HTN, MR, and HFrEF (EF 20-25%).  He presents to clinic today for follow-up for his admission for anemia.    His problem started with toothache for which he started taking pain medication including Ibuprofen, he then started having hematemesis and went to the ED at [facility]. He underwent an urgent EGD that showed grade A erosive esophagitis and gastris, he was advised to start taking Protonix for 6 weeks and to avoid NSAIDs.  His EF has improved to 53%, he is following recommendations religiously and watching his salt intake, he hasn't needed to use his lasix in a long time, isn't having any dyspnea.        Review of Systems  A comprehensive 12 point review of systems was completed.  Please see above for pertinent positives and negatives.     Objective:   Last 24 Hour Vital Signs:       Physical Examination:  General: Awake, alert, & oriented to person, place & time. No acute distress  Psychiatric: Mood and affect normal  HEENT: Normocephalic, atraumatic. Face symmetric.  Cardiovascular: Regular rate & rhythm. Normal S1 & S2 w/out murmurs, rubs or gallops.  Pulmonary: Bilateral symmetric chest rise. Non-labored, no wheezes, rhonchi or crackles are appreciated.  Abdominal:  Nondistended. Bowel sounds present  Extremities: No clubbing, cyanosis or edema.  Skin:  Exposed skin is warm & dry.  Neuro:   Patient moves all extremities equally. Sensation intact bilateraly.    Labs  CBC:   Lab Results   Component Value Date    WBC 6.93 10/29/2024    HGB 12.1 (L) 10/29/2024    HCT 36.9 (L) 10/29/2024     10/29/2024    MCV 94.4 (H) 10/29/2024    RDW 13.0 10/29/2024     BMP:   Lab Results   Component Value Date     10/29/2024    K 4.1 10/29/2024    CO2 25 10/29/2024    BUN 18.8 10/29/2024    CREATININE 0.82 10/29/2024    GLUCOSE 124 (H) 10/29/2024     "CALCIUM 8.8 10/29/2024     LFTs:   Lab Results   Component Value Date    ALBUMIN 3.8 10/29/2024    BILITOT 0.4 10/29/2024    AST 12 10/29/2024    ALKPHOS 55 10/29/2024    ALT 12 10/29/2024     Coags:   Lab Results   Component Value Date    INR 1.0 10/28/2024     FLP:   Lab Results   Component Value Date    CHOL 170 09/04/2023    HDL 66 (H) 09/04/2023    LDL 88.00 09/04/2023    TRIG 80 09/04/2023     DM:   Lab Results   Component Value Date    HGBA1C 5.4 09/04/2023    CREATININE 0.82 10/29/2024     Thyroid:   Lab Results   Component Value Date    TSH 2.131 09/03/2023     Anemia: No results found for: "IRON", "FERRITIN", "TRANS", "TIBC", "ARMLOYIV75", "FOLATE"  Cardiac:   Lab Results   Component Value Date    TROPONINI <0.010 09/04/2023     09/03/2023    CPKMB 3.5 09/03/2023    .5 (H) 09/03/2023     Urinalysis:   Lab Results   Component Value Date    COLORU Colorless (A) 09/04/2023    NITRITE Negative 09/04/2023    UROBILINOGEN Normal 09/04/2023    WBCUA 0-5 09/04/2023         Assessment & Plan:     HFrecEF (EF 53% improved from 20-25%)  Continue Jardiance 10 mg Daily, Lasix 20 mg daily, Toprol 50 mg daily, and Entresto 24-26 BID  Aldactone was discontinued due to Gynecomastia and hypotension, metoprolol was decreased due to pauses on holter  restrict water intake to 1500 mL a day, slat intake no more than 2000 mg per day  f/u with cardiology    Grade A esophagitis and gastritis  Hospitalized for hematemesis, secondary to NSAIDs use  H. Pylori negative  Avoid NSAIDs and continue Pantoprazole for 5 more weeks    Breast Mass  Showed up a month ago and grew in size significantly  Subareolar, rubbery, non tender, approximately 1 inch in diameter  No lymphadenopathy  Mammogram and breast US was consistent with gynecomastia, likely due to Aldactone    Foot Mass  On the right sole  Rubbery, nontender  Most likely a lipoma  Patient doesn't feels it when he walk and it doesn't bother him  No need for intervention " at this point        Health Maintenance/ Wellness  Pneumococcal vaccine: not descussed  TDAP: not descussed  Influenza vaccine: not descussed  Shingrix vaccine: NA  AAA U/S screening: NA  Screening colonoscopy: referred to GI  Lung Cancer Screening: NA  Hepatitis Panel: ordered  HIV - nonreactive      Follow up in about 6 months (around 5/4/2025).  With labs prior      Yu Aguilera MD  Internal Medicine - PGY-1

## 2024-11-04 NOTE — PROGRESS NOTES
I have reviewed and agree with the resident's findings, including all diagnostic interpretations and plans as written.    Pt with recent hospitalization 10/28-10/29 for hematemesis. GI performed EGD noting LA Grade A esophagitis, gastritis, duodenitis likely 2/2 to NSAID and alcohol usage. Gastral bx negative for H.pylori. Pt without further episodes of hematemesis and avoiding NSAIDs. Pt to finish course of PPI for 6 weeks. Continues to have dental pain on Norco. He will need to follow up with dentistry. Today also tx as regular follow up visit. Pt doing well from Heart failure standpoint with minimal use of lasix and without SOB. Continue GDMT with SGLT2i, BB and Entresto and f/u with cards. No longer on spironolactone due to gynecomastia. Unclear if pt is still receiving Adderal for ADHD--would verify with patient. Recommend referral to GI for colonoscopy and to follow up for upper GI bleeding. Rest per resident note.    Arely Mistry MD

## 2024-11-14 ENCOUNTER — OFFICE VISIT (OUTPATIENT)
Dept: CARDIOLOGY | Facility: CLINIC | Age: 49
End: 2024-11-14
Payer: MEDICAID

## 2024-11-14 VITALS
OXYGEN SATURATION: 97 % | HEIGHT: 66 IN | TEMPERATURE: 98 F | HEART RATE: 64 BPM | DIASTOLIC BLOOD PRESSURE: 70 MMHG | RESPIRATION RATE: 18 BRPM | SYSTOLIC BLOOD PRESSURE: 97 MMHG | BODY MASS INDEX: 17.26 KG/M2 | WEIGHT: 107.38 LBS

## 2024-11-14 DIAGNOSIS — I50.23 ACUTE ON CHRONIC HFREF (HEART FAILURE WITH REDUCED EJECTION FRACTION): ICD-10-CM

## 2024-11-14 DIAGNOSIS — I34.0 NONRHEUMATIC MITRAL VALVE REGURGITATION: Chronic | ICD-10-CM

## 2024-11-14 DIAGNOSIS — R00.0 TACHYCARDIA: Primary | ICD-10-CM

## 2024-11-14 DIAGNOSIS — I50.20 HFREF (HEART FAILURE WITH REDUCED EJECTION FRACTION): Chronic | ICD-10-CM

## 2024-11-14 PROCEDURE — 1111F DSCHRG MED/CURRENT MED MERGE: CPT | Mod: CPTII,,,

## 2024-11-14 PROCEDURE — 3008F BODY MASS INDEX DOCD: CPT | Mod: CPTII,,,

## 2024-11-14 PROCEDURE — 4010F ACE/ARB THERAPY RXD/TAKEN: CPT | Mod: CPTII,,,

## 2024-11-14 PROCEDURE — 1159F MED LIST DOCD IN RCRD: CPT | Mod: CPTII,,,

## 2024-11-14 PROCEDURE — 99215 OFFICE O/P EST HI 40 MIN: CPT | Mod: PBBFAC

## 2024-11-14 PROCEDURE — 3078F DIAST BP <80 MM HG: CPT | Mod: CPTII,,,

## 2024-11-14 PROCEDURE — 99214 OFFICE O/P EST MOD 30 MIN: CPT | Mod: S$PBB,,,

## 2024-11-14 PROCEDURE — 3074F SYST BP LT 130 MM HG: CPT | Mod: CPTII,,,

## 2024-11-14 PROCEDURE — 1160F RVW MEDS BY RX/DR IN RCRD: CPT | Mod: CPTII,,,

## 2024-11-14 NOTE — PROGRESS NOTES
CHIEF COMPLAINT:   Chief Complaint   Patient presents with    Follow-up     6 mos f/u denies cardiac targets                                                  HPI:  Todd Bernal 49 y.o. male with no significant PMH who presents to Cardiology Clinic to establish care.  Of note, he presented to [facility] ED on 9/4 with complaints of SOB x1 week. 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 SOB started after exposure. On arrival noted to have elevated BNP and Cardiomegaly. Subsequent workup showed severely reduced EF with moderate to severe MR on TTE.  Patient underwent LHC which revealed no evidence of coronary artery disease, all coronaries were normal.  Patient also underwent ROXANNE which revealed an EF of 20-25%, no evidence of valvular disease.  Patient endorsed family history of premature CAD.  He was seen by Dr. Fonseca while inpatient.  At last office visit patient stated well overall.    Today the patient states that he feels very well from a cardiac standpoint.  He denies any cardiac complaints such as chest pain, SOB, KUO, palpitations, PND, orthopnea, lightheadedness, dizziness, syncope, lower extremity edema, or claudication symptoms.  He states that since he has reduced his fluid, sodium, and caffeine intake his symptoms have gotten much better.  He states that he previously drank approximately 4-5 energy drinks per day with continuous coffee in between.  He no longer does this.  He states that he exercises regularly by walking and riding bike.  He follows a low-sodium and a heart healthy diet.  He is able to complete his ADLs without any issues or ischemic symptoms.  He reports compliance with all his current medications and he was tolerating them well.  He denies any tobacco or other illicit drug use.                                                                                                                                                                                                                                            CARDIAC TESTIN Day Cardiac Event Monitor 10.1.23-10  Underlying rhythm is sinus   No symptoms reported  No patient activated events   Patient was noted to have 3.2nd pause.  Pauses greater than 3 seconds or considered significant     ROXANNE .    Left Ventricle: The left ventricle is moderately dilated. Normal wall thickness. Global hypokinesis present. There is severely reduced systolic function with a visually estimated ejection fraction of 20 - 25%.    Left Atrium: Left atrium was not well visualized.    Right Ventricle: Normal right ventricular cavity size. Systolic function is normal.    Aortic Valve: The aortic valve is a trileaflet valve.     Echo .3.23    Left Ventricle: The left ventricle is moderately dilated. There is severely reduced systolic function with a visually estimated ejection fraction of 15 - 20%.    Left Atrium: Left atrium is dilated. LA volume index is 62ml/m2.    Right Ventricle: Normal right ventricular cavity size. Systolic function is normal.    Right Atrium: Right atrium is mildly dilated.    Aortic Valve: The aortic valve is a trileaflet valve.    Mitral Valve: There is moderate to severe regurgitation    Tricuspid Valve: There is mild to moderate regurgitation.    Pulmonary Artery: The estimated pulmonary artery systolic pressure is 50 mmHg.    IVC/SVC: Normal venous pressure at 3 mmHg.    Pericardium: There is a small effusion. Bilateral pleural effusions.     Cardiac catheterization 9.3.23     Conclusion    The pre-procedure left ventricular end diastolic pressure was 20.    The estimated blood loss was <50 mL.    The coronary arteries were normal..     FINDINGS  LVEDP:  20 mmHg  Left Main:  No stenosis  LAD:  No stenosis  Circumflex:  No stenosis  RCA:  No stenosis  The patient has No CAD  Blood loss:  less than 10 cc.     RECOMMENDATIONS  Risk factor modifications  Activity -- avoid straining with affected limb for one week  Exercise --  as tolerated  Precautions post D/C -- come to ER for hematoma, unusual pain, erythema, or unusual drainage at access site  Precautions post D/C -- if develop bleeding or hematoma at access site, hold pressure at access site, and come to ER     POST-CATH GUIDELINE FOR INPATIENT DISCHARGE  No hematoma or swelling at access site  No unusual tenderness at access site  Adequate distal pulse or capillary refill in limb(s) accessed  No unusual difficulty with ambulation after bed rest is over                                                                                                                                                                                                         Patient Active Problem List   Diagnosis    Nonrheumatic mitral valve regurgitation    HFrEF (heart failure with reduced ejection fraction)    Acute on chronic HFrEF (heart failure with reduced ejection fraction)    Tachycardia    Gynecomastia, male    GIB (gastrointestinal bleeding)     Past Surgical History:   Procedure Laterality Date    ANGIOGRAM, CORONARY, WITH LEFT HEART CATHETERIZATION N/A 09/06/2023    Procedure: Angiogram, Coronary, with Left Heart Cath;  Surgeon: Lexa Fonseca MD;  Location: [facility] CATH LAB;  Service: Cardiology;  Laterality: N/A;    CARDIAC CATHETERIZATION      ESOPHAGOGASTRODUODENOSCOPY N/A 10/28/2024    Procedure: EGD;  Surgeon: Darrian Mcgee MD;  Location: [facility] ENDOSCOPY;  Service: Endoscopy;  Laterality: N/A;     Social History     Socioeconomic History    Marital status: Unknown   Tobacco Use    Smoking status: Never    Smokeless tobacco: Never   Substance and Sexual Activity    Alcohol use: Never    Drug use: Never     Social Drivers of Health     Financial Resource Strain: Low Risk  (10/29/2024)    Overall Financial Resource Strain (CARDIA)     Difficulty of Paying Living Expenses: Not hard at all   Food Insecurity: No Food Insecurity (10/29/2024)    Hunger Vital Sign     Worried About Running  "Out of Food in the Last Year: Never true     Ran Out of Food in the Last Year: Never true   Transportation Needs: No Transportation Needs (10/29/2024)    TRANSPORTATION NEEDS     Transportation : No   Physical Activity: Inactive (10/29/2024)    Exercise Vital Sign     Days of Exercise per Week: 0 days     Minutes of Exercise per Session: 0 min   Stress: No Stress Concern Present (10/29/2024)    [employer] [place] [employer] - Occupational Stress Questionnaire     Feeling of Stress : Not at all   Housing Stability: Low Risk  (10/29/2024)    Housing Stability Vital Sign     Unable to Pay for Housing in the Last Year: No     Homeless in the Last Year: No        Family History   Problem Relation Name Age of Onset    Hyperlipidemia Father      Heart disease Father       Review of patient's allergies indicates:   Allergen Reactions    Phenergan [promethazine]          ROS:  Review of Systems   Constitutional: Negative.    HENT: Negative.     Eyes: Negative.    Respiratory: Negative.  Negative for shortness of breath.    Cardiovascular: Negative.  Negative for chest pain, palpitations, orthopnea, claudication, leg swelling and PND.   Gastrointestinal: Negative.    Genitourinary: Negative.    Musculoskeletal: Negative.    Skin: Negative.    Neurological: Negative.    Endo/Heme/Allergies: Negative.    Psychiatric/Behavioral: Negative.                                                                                                                                                                                  Negative except as stated in the history of present illness. See HPI for details.    PHYSICAL EXAM:  Visit Vitals  BP 97/70   Pulse 64   Temp 97.6 °F (36.4 °C) (Oral)   Resp 18   Ht 5' 6.04" (1.677 m)   Wt 48.7 kg (107 lb 5.8 oz)   SpO2 97%   BMI 17.31 kg/m²       Physical Exam  Constitutional:       Appearance: Normal appearance.   HENT:      Head: Normocephalic.      Mouth/Throat:      Mouth: Mucous " membranes are moist.   Eyes:      Extraocular Movements: Extraocular movements intact.   Neck:      Vascular: No carotid bruit.   Cardiovascular:      Rate and Rhythm: Normal rate and regular rhythm.      Pulses: Normal pulses.      Heart sounds: Normal heart sounds.   Pulmonary:      Effort: Pulmonary effort is normal.   Abdominal:      General: There is no distension.   Musculoskeletal:         General: Normal range of motion.      Right lower leg: No edema.      Left lower leg: No edema.   Skin:     General: Skin is warm and dry.   Neurological:      General: No focal deficit present.      Mental Status: He is alert and oriented to person, place, and time.   Psychiatric:         Mood and Affect: Mood normal.         Behavior: Behavior normal.         Current Outpatient Medications   Medication Instructions    dextroamphetamine-amphetamine (ADDERALL XR) 5 MG 24 hr capsule 5 mg, 3 times daily    empagliflozin (JARDIANCE) 10 mg, Oral, Daily    fluvoxaMINE (LUVOX) 50 mg, Nightly    furosemide (LASIX) 20 mg, Oral, Daily PRN    metoprolol succinate (TOPROL-XL) 50 mg, Oral, Daily    pantoprazole (PROTONIX) 40 mg, Oral, Daily    sacubitriL-valsartan (ENTRESTO) 24-26 mg per tablet 1 tablet, Oral, 2 times daily    venlafaxine (EFFEXOR-XR) 75 mg        All medications, laboratory studies, cardiac diagnostic imaging reviewed.     Lab Results   Component Value Date    LDL 88.00 09/04/2023    TRIG 80 09/04/2023    CREATININE 0.82 10/29/2024    MG 2.20 10/29/2024    K 4.1 10/29/2024        ASSESSMENT/PLAN:    HFrecEF (53% on 3/28, previously 20-25%)   - Asymptomatic today (see HPI) - no change in symptoms from last office visit, overall he states that he feels very well   - HF Status: NYHA Class I   - LVEF: 15-20% (TTE on 9.5.23)    - LVEF: 20-25% (ROXANNE on 9.8.23)  - LVEF: 53% (ROXANNE on 3/28/24)  - Continue Entresto and Jardiance   - Discontinued Spironolactone at last office visit secondary to gynecomastia and hypotension- he  states this has greatly helped with his symptoms  - Continue Metroprolol to 50 mg daily secondary to pauses on holter (see below)  - Continue Lasix PRN  - Strict Is/Os and daily weights  - Ischemic workup (C) negative (see full report above)  - 30 day cardiac event monitor was ordered to evaluate for underlying rate/rhythm as possible cars for cardiomyopathy.  Thirty day cardiac event monitor did reveal a normal sinus rhythm as the predominant rhythm, however patient was noted to have several pauses with the longest pause being 3.3 seconds long.   - No need for further testing at this time  - Strict ED precautions given     Mitral Regurgitation   - Asymptomatic    - Stable from last appointment, denies symptoms    - Moderate to severe on previous TTE, no remarked on on most recent TTE (March 2024)  - No evidence of MR on subsequent ROXANNE  - No murmur appreciated on exam     HTN  - BP 97/70 today with recovered EF  - Continue with current medications as listed above   - Continue Low Na Diet, 1.5L fluid restriction and Exercise as tolerated       Follow up in cardiology clinic in 6 months or sooner if needed   Follow up with PCP as directed   Please notify clinic if any new concerns or any change in symptoms

## 2025-02-25 RX ORDER — SACUBITRIL AND VALSARTAN 24; 26 MG/1; MG/1
1 TABLET, FILM COATED ORAL 2 TIMES DAILY
Qty: 180 TABLET | Refills: 2 | Status: SHIPPED | OUTPATIENT
Start: 2025-02-25

## 2025-02-25 RX ORDER — SACUBITRIL AND VALSARTAN 24; 26 MG/1; MG/1
1 TABLET, FILM COATED ORAL 2 TIMES DAILY
COMMUNITY
Start: 2025-01-27 | End: 2025-02-25 | Stop reason: SDUPTHER

## 2025-02-25 NOTE — TELEPHONE ENCOUNTER
LCV---11/14/24  NCV---5/14/25    PER YOUR NOTES ON LAST VISIT SPIRONOLACTONE 25MG WAS   D/C   REQUEST SENT TO REFILL

## 2025-03-03 ENCOUNTER — TELEPHONE (OUTPATIENT)
Dept: CARDIOLOGY | Facility: CLINIC | Age: 50
End: 2025-03-03
Payer: MEDICAID

## 2025-03-03 NOTE — TELEPHONE ENCOUNTER
LCV---11/14/24  NCV---5/14/25    IN YOUR NOTES YOU SAY CONTINUE JARDIANCE BUT HE WASN'T ON IT  DO YOU WANT HIM TO START?

## 2025-05-22 ENCOUNTER — OFFICE VISIT (OUTPATIENT)
Dept: INTERNAL MEDICINE | Facility: CLINIC | Age: 50
End: 2025-05-22
Payer: MEDICAID

## 2025-05-22 VITALS
TEMPERATURE: 98 F | WEIGHT: 115.19 LBS | SYSTOLIC BLOOD PRESSURE: 106 MMHG | OXYGEN SATURATION: 96 % | DIASTOLIC BLOOD PRESSURE: 73 MMHG | HEIGHT: 66 IN | HEART RATE: 90 BPM | BODY MASS INDEX: 18.51 KG/M2 | RESPIRATION RATE: 18 BRPM

## 2025-05-22 DIAGNOSIS — I50.20 HFREF (HEART FAILURE WITH REDUCED EJECTION FRACTION): Primary | Chronic | ICD-10-CM

## 2025-05-22 DIAGNOSIS — K20.90 ESOPHAGITIS: ICD-10-CM

## 2025-05-22 DIAGNOSIS — Z12.11 ENCOUNTER FOR COLORECTAL CANCER SCREENING: ICD-10-CM

## 2025-05-22 DIAGNOSIS — Z12.12 ENCOUNTER FOR COLORECTAL CANCER SCREENING: ICD-10-CM

## 2025-05-22 PROCEDURE — 99214 OFFICE O/P EST MOD 30 MIN: CPT | Mod: PBBFAC

## 2025-05-22 RX ORDER — FAMOTIDINE 20 MG/1
20 TABLET, FILM COATED ORAL 2 TIMES DAILY PRN
Qty: 30 TABLET | Refills: 2 | Status: SHIPPED | OUTPATIENT
Start: 2025-05-22 | End: 2026-05-22

## 2025-05-22 NOTE — PROGRESS NOTES
I have reviewed and agree with the resident's findings, including all diagnostic interpretations and plans as written.    Pt is here today for follow up. No acute issues today. Able to do ADL's without limitation. Pt due for colon cancer screening. Also no Hep C documented in chart--will need in future. Remainder of care as documented per resident.    Arely Mistry MD

## 2025-05-22 NOTE — PROGRESS NOTES
"[facility] Visit    Chief Complaint:      Follow-up (6 month follow up) and Medication Refill     Subjective:     HPI:  Todd Bernal is a 50 y.o. male with PMH of HTN, MR, and HFrEF (EF 20-25%).  He presents to clinic today for follow-up for his admission for anemia.    He has no complaints today, taking his medication as prescribed, can mow the lawn and is working on his car without SOB.  He has family history of colon polyps in his brother but no colon cancer, denies blood in stool or black stool, agreed on doing cologuar.        Review of Systems  A comprehensive 12 point review of systems was completed.  Please see above for pertinent positives and negatives.     Objective:   Last 24 Hour Vital Signs:  Vitals  BP: 106/73  Temp: 98.2 °F (36.8 °C)  Temp Source: Oral  Pulse: 90  Resp: 18  SpO2: 96 %  Height: 5' 6" (167.6 cm)  Weight: 52.3 kg (115 lb 3.2 oz)    Physical Examination:  General: Awake, alert, & oriented to person, place & time. No acute distress  Psychiatric: Mood and affect normal  HEENT: Normocephalic, atraumatic. Face symmetric.  Cardiovascular: Regular rate & rhythm. Normal S1 & S2 w/out murmurs, rubs or gallops.  Pulmonary: Bilateral symmetric chest rise. Non-labored, no wheezes, rhonchi or crackles are appreciated.  Abdominal:  Nondistended. Bowel sounds present  Extremities: No clubbing, cyanosis or edema.  Skin:  Exposed skin is warm & dry.  Neuro:   Patient moves all extremities equally. Sensation intact bilateraly.    Labs  CBC:   Lab Results   Component Value Date    WBC 6.93 10/29/2024    HGB 12.1 (L) 10/29/2024    HCT 36.9 (L) 10/29/2024     10/29/2024    MCV 94.4 (H) 10/29/2024    RDW 13.0 10/29/2024     BMP:   Lab Results   Component Value Date     10/29/2024    K 4.1 10/29/2024    CO2 25 10/29/2024    BUN 18.8 10/29/2024    CREATININE 0.82 10/29/2024    CALCIUM 8.8 10/29/2024     LFTs:   Lab Results   Component Value Date    PROT 6.1 (L) 10/29/2024    " "ALBUMIN 3.8 10/29/2024    BILITOT 0.4 10/29/2024    AST 12 10/29/2024    ALKPHOS 55 10/29/2024    ALT 12 10/29/2024     Coags:   Lab Results   Component Value Date    INR 1.0 10/28/2024    PROTIME 13.0 10/28/2024     FLP:   Lab Results   Component Value Date    CHOL 170 09/04/2023    HDL 66 (H) 09/04/2023    LDL 88.00 09/04/2023    TRIG 80 09/04/2023     DM:   Lab Results   Component Value Date    HGBA1C 5.4 09/04/2023    CREATININE 0.82 10/29/2024     Thyroid:   Lab Results   Component Value Date    TSH 2.131 09/03/2023     Anemia: No results found for: "IRON", "FERRITIN", "TRANS", "TIBC", "[id number]", "FOLATE"  Cardiac:   Lab Results   Component Value Date    TROPONINI <0.010 09/04/2023     09/03/2023    CPKMB 3.5 09/03/2023    .5 (H) 09/03/2023     Urinalysis:   Lab Results   Component Value Date    COLORU Colorless (A) 09/04/2023    NITRITE Negative 09/04/2023    UROBILINOGEN Normal 09/04/2023    WBCUA 0-5 09/04/2023         Assessment & Plan:     HFrecEF (EF 53% improved from 20-25%)  Continue Jardiance 10 mg Daily, Lasix 20 mg daily, Toprol 50 mg daily, and Entresto 24-26 BID  Aldactone was discontinued due to Gynecomastia and hypotension, metoprolol was decreased due to pauses on holter  restrict water intake to 1500 mL a day, slat intake no more than 2000 mg per day  f/u with cardiology    Grade A esophagitis and gastritis  Patient is having no issues, hasn't taken his ppi the last 4 days  Will send pepcid prn    Foot Mass  On the right sole  Rubbery, nontender  Most likely a lipoma  Patient doesn't feels it when he walk and it doesn't bother him  No need for intervention at this point        Health Maintenance/ Wellness  Pneumococcal vaccine: not descussed  TDAP: not descussed  Influenza vaccine: not descussed  Shingrix vaccine: NA  AAA U/S screening: NA  Screening colonoscopy: ordered cologuard  Lung Cancer Screening: NA  Hepatitis Panel: ordered  HIV - nonreactive      Follow up in about 6 " months (around 11/22/2025).  With labs prior      Yu Aguilera MD  Internal Medicine - PGY-2

## 2025-05-30 DIAGNOSIS — R00.0 TACHYCARDIA: Primary | ICD-10-CM

## 2025-05-30 DIAGNOSIS — I50.23 ACUTE ON CHRONIC HFREF (HEART FAILURE WITH REDUCED EJECTION FRACTION): ICD-10-CM

## 2025-05-30 RX ORDER — FUROSEMIDE 20 MG/1
20 TABLET ORAL DAILY PRN
Qty: 90 TABLET | Refills: 3 | Status: SHIPPED | OUTPATIENT
Start: 2025-05-30 | End: 2026-05-30

## 2025-05-30 RX ORDER — METOPROLOL SUCCINATE 50 MG/1
50 TABLET, EXTENDED RELEASE ORAL DAILY
Qty: 90 TABLET | Refills: 3 | Status: SHIPPED | OUTPATIENT
Start: 2025-05-30

## 2025-05-30 NOTE — TELEPHONE ENCOUNTER
----- Message from Cerenity sent at 5/30/2025  9:41 AM CDT -----  Regarding: Rx refills / new script  Lv: 11/14/24Nv: 9/19/25Provider: Mrs. Conner (but Dr. Orozco has filled both of these before)Pt needs refills on metoprolol succinate (TOPROL-XL) 50 MG 24 hr tablet.Pt also needs a new script on furosemide (LASIX) 20 MG tablet.[id number] [address] [place] [address] & [facility]## I have confirmed with the pharmacy that the pt is out of both meds.

## 2025-05-31 RX ORDER — FUROSEMIDE 20 MG/1
20 TABLET ORAL DAILY PRN
Qty: 90 TABLET | Refills: 3 | Status: SHIPPED | OUTPATIENT
Start: 2025-05-31 | End: 2026-05-31

## (undated) DEVICE — CATH OPTITORQUE RADIAL 5FR

## (undated) DEVICE — KIT CANIST SUCTION 1200CC

## (undated) DEVICE — INTRODUCER TRNSRADIAL 6F 10CM

## (undated) DEVICE — GUIDEWIRE EMERALD 3MM 175X5CM

## (undated) DEVICE — BITE BLOCK ADULT LATEX FREE

## (undated) DEVICE — DRAPE RADIAL BRACHIAL 29X42IN

## (undated) DEVICE — CONTAINER SPECIMEN STRL 4OZ

## (undated) DEVICE — KIT SURGICAL COLON .25 1.1OZ

## (undated) DEVICE — COVER CIV-FLEX PROBE US 58IN

## (undated) DEVICE — NDL BLUNT FILL 18G 1IN

## (undated) DEVICE — FORCEP BIOPSY 6FR 104CM

## (undated) DEVICE — COLLECTION SPECIMEN NEPTUNE

## (undated) DEVICE — Device

## (undated) DEVICE — HEMOSTAT VASC BAND REG 24CM

## (undated) DEVICE — OMNIPAQUE 350MG 150ML VIAL

## (undated) DEVICE — CONTAINERS SPECIMEN 4OZ CAP

## (undated) DEVICE — TIP SUCTION YANKAUER

## (undated) DEVICE — TUBING O2 FEMALE CONN 13FT

## (undated) DEVICE — SOL IRRI STRL WATER 1000ML

## (undated) DEVICE — DRSNG POLYSKIN TRNSPAR 4X4.75